# Patient Record
Sex: FEMALE | Race: WHITE | Employment: FULL TIME | URBAN - METROPOLITAN AREA
[De-identification: names, ages, dates, MRNs, and addresses within clinical notes are randomized per-mention and may not be internally consistent; named-entity substitution may affect disease eponyms.]

---

## 2017-06-01 ENCOUNTER — TRANSFERRED RECORDS (OUTPATIENT)
Dept: HEALTH INFORMATION MANAGEMENT | Facility: CLINIC | Age: 52
End: 2017-06-01

## 2018-11-13 ENCOUNTER — TRANSFERRED RECORDS (OUTPATIENT)
Dept: HEALTH INFORMATION MANAGEMENT | Facility: CLINIC | Age: 53
End: 2018-11-13

## 2018-11-14 ENCOUNTER — OFFICE VISIT (OUTPATIENT)
Dept: OBGYN | Facility: CLINIC | Age: 53
End: 2018-11-14
Payer: COMMERCIAL

## 2018-11-14 ENCOUNTER — RADIANT APPOINTMENT (OUTPATIENT)
Dept: MAMMOGRAPHY | Facility: CLINIC | Age: 53
End: 2018-11-14
Payer: COMMERCIAL

## 2018-11-14 VITALS
HEIGHT: 66 IN | HEART RATE: 84 BPM | SYSTOLIC BLOOD PRESSURE: 140 MMHG | WEIGHT: 189.8 LBS | BODY MASS INDEX: 30.5 KG/M2 | DIASTOLIC BLOOD PRESSURE: 84 MMHG

## 2018-11-14 DIAGNOSIS — N93.8 DUB (DYSFUNCTIONAL UTERINE BLEEDING): ICD-10-CM

## 2018-11-14 DIAGNOSIS — Z12.31 VISIT FOR SCREENING MAMMOGRAM: ICD-10-CM

## 2018-11-14 DIAGNOSIS — N95.1 SYMPTOMATIC MENOPAUSAL OR FEMALE CLIMACTERIC STATES: ICD-10-CM

## 2018-11-14 DIAGNOSIS — Z01.419 ENCOUNTER FOR GYNECOLOGICAL EXAMINATION WITHOUT ABNORMAL FINDING: Primary | ICD-10-CM

## 2018-11-14 PROCEDURE — 83001 ASSAY OF GONADOTROPIN (FSH): CPT | Performed by: NURSE PRACTITIONER

## 2018-11-14 PROCEDURE — 99396 PREV VISIT EST AGE 40-64: CPT | Performed by: NURSE PRACTITIONER

## 2018-11-14 PROCEDURE — 77063 BREAST TOMOSYNTHESIS BI: CPT | Mod: TC

## 2018-11-14 PROCEDURE — 87624 HPV HI-RISK TYP POOLED RSLT: CPT | Performed by: NURSE PRACTITIONER

## 2018-11-14 PROCEDURE — 36415 COLL VENOUS BLD VENIPUNCTURE: CPT | Performed by: NURSE PRACTITIONER

## 2018-11-14 PROCEDURE — G0145 SCR C/V CYTO,THINLAYER,RESCR: HCPCS | Performed by: NURSE PRACTITIONER

## 2018-11-14 PROCEDURE — 77067 SCR MAMMO BI INCL CAD: CPT | Mod: TC

## 2018-11-14 ASSESSMENT — ANXIETY QUESTIONNAIRES
3. WORRYING TOO MUCH ABOUT DIFFERENT THINGS: NOT AT ALL
6. BECOMING EASILY ANNOYED OR IRRITABLE: NOT AT ALL
2. NOT BEING ABLE TO STOP OR CONTROL WORRYING: NOT AT ALL
1. FEELING NERVOUS, ANXIOUS, OR ON EDGE: NOT AT ALL
GAD7 TOTAL SCORE: 0
7. FEELING AFRAID AS IF SOMETHING AWFUL MIGHT HAPPEN: NOT AT ALL
IF YOU CHECKED OFF ANY PROBLEMS ON THIS QUESTIONNAIRE, HOW DIFFICULT HAVE THESE PROBLEMS MADE IT FOR YOU TO DO YOUR WORK, TAKE CARE OF THINGS AT HOME, OR GET ALONG WITH OTHER PEOPLE: NOT DIFFICULT AT ALL
5. BEING SO RESTLESS THAT IT IS HARD TO SIT STILL: NOT AT ALL

## 2018-11-14 ASSESSMENT — PATIENT HEALTH QUESTIONNAIRE - PHQ9
5. POOR APPETITE OR OVEREATING: NOT AT ALL
SUM OF ALL RESPONSES TO PHQ QUESTIONS 1-9: 2

## 2018-11-14 NOTE — MR AVS SNAPSHOT
"              After Visit Summary   11/14/2018    Isabel Irene    MRN: [id number]           Patient Information     Date Of Birth          1965        Visit Information        Provider Department      11/14/2018 1:00 PM Zenobia King APRN CNP [facility]        Today's Diagnoses     Encounter for gynecological examination without abnormal finding    -  1    Symptomatic menopausal or female climacteric states        DUB (dysfunctional uterine bleeding)           Follow-ups after your visit        Future tests that were ordered for you today     Open Future Orders        Priority Expected Expires Ordered    US Transvaginal Non OB Routine  11/15/2019 11/14/2018            Who to contact     If you have questions or need follow up information about today's clinic visit or your schedule please [facility] directly at [phone].  Normal or non-critical lab and imaging results will be communicated to you by MyChart, letter or phone within 4 business days after the clinic has received the results. If you do not hear from us within 7 days, please contact the clinic through MyChart or phone. If you have a critical or abnormal lab result, we will notify you by phone as soon as possible.  Submit refill requests through [facility] or call your pharmacy and they will forward the refill request to us. Please allow 3 business days for your refill to be completed.          Additional Information About Your Visit        Care EveryWhere ID     This is your Care EveryWhere ID. This could be used by other organizations to access your [place] medical records  [id number]        Your Vitals Were     Pulse Height Last Period Breastfeeding? BMI (Body Mass Index)       84 5' 6.25\" (1.683 m) 06/10/2018 No 30.4 kg/m2        Blood Pressure from Last 3 Encounters:   11/14/18 140/84    Weight from Last 3 Encounters:   11/14/18 189 lb 12.8 oz (86.1 kg)              We Performed " the Following     Follicle stimulating hormone     HPV High Risk Types DNA Cervical     Pap imaged thin layer screen with HPV - recommended age 30 - 65        Primary Care Provider Fax #    Physician No Ref-Primary [phone]       No address on file        Equal Access to Services     DEBBIE AMATO : Hadii aad ku hadjudi Gonzalez, maria eugenia frazier, diamond siddiqui, valerio keenanmolly castellonjeferson painter chirag shepard. So [facility] [phone].    ATENCIÓN: Si habla español, tiene a welch disposición servicios gratuitos de asistencia lingüística. Llame al [phone].    We comply with applicable federal civil rights laws and [place] laws. We do not discriminate on the basis of race, color, national origin, age, disability, sex, sexual orientation, or gender identity.            Thank you!     Thank you for choosing [facility]  [place] your [place]. Our goal is always to provide you with excellent care. Hearing back from our patients is one way we can continue to improve our services. Please take a few minutes to complete the written survey that you may receive in the mail after your visit with us. Thank you!             Your Updated Medication List - Protect others around you: Learn how to safely use, store and throw away your medicines at www.disposemymeds.org.      Notice  As of 11/14/2018  1:51 PM    You have not been prescribed any medications.

## 2018-11-14 NOTE — NURSING NOTE
Please abstract the following data from this visit with this patient into the appropriate field in Epic:    Colonoscopy done on this date: may or June 2017 (approximately), by this group: [facility], results were [place] found. Due in  2022.

## 2018-11-14 NOTE — LETTER
November 28, 2018    Isabel Irene  [address] [place] [address]  [place] [address]    Dear Isabel,  We are happy to inform you that your PAP smear result from 11/14/18 is normal.  We are now able to do a follow up test on PAP smears. The DNA test is for HPV (Human Papilloma Virus). Cervical cancer is closely linked with certain types of HPV. Your results showed no evidence of high risk HPV.  Therefore we recommend you return in 5 years for your next pap smear and HPV test.  You will still need to return to the clinic every year for an annual exam and other preventive tests.  If you have additional questions regarding this result, please call our registered nurse,  at [phone].  Sincerely,    JOSE CRUZ Anthony CNP/rlm

## 2018-11-14 NOTE — Clinical Note
Please abstract the following data from this visit with this patient into the appropriate field in Epic:  Colonoscopy done on this date: may or June 2017 (approximately), by this group: [facility], results were [place] found. Due in  2022.

## 2018-11-14 NOTE — PROGRESS NOTES
Isabel is a 53 year old  female who presents for annual exam.     Besides routine health maintenance,  she would like to discuss little lump above right breast. .    HPI:here for annual exam.  Has been 4 years since last exam.  She thinks she is in menopause, has no symptons.  Went a year without a cycle, then this last  had a week of bleeding, like a normal cycle.  Now has concerns she states she had a dream that was very vivid last week that woke her up stating she has uterine cancer.  She knows that sounds weird, but she had a dream like that when she didn't know she was pregnant, wasn't trying and dream told her she was pregnant and is a girl and that was correct.  She has no other symptons to make think otherwise.  All blood work is done by PCP.    The patient's PCP is [facility]        GYNECOLOGIC HISTORY:    Patient's last menstrual period was 06/10/2018.  Her current contraception method is: none.  She  reports that she has never smoked. She has never used smokeless tobacco.    Patient is sexually active.  STD testing offered?  Declined  Last PHQ-9 score on record =   PHQ-9 SCORE 2018   Total Score 2     Last GAD7 score on record =   VLAD-7 SCORE 2018   Total Score 0     Alcohol Score = 1    HEALTH MAINTENANCE:  Cholesterol: (No results found for: CHOL   Last Mammo: 14, Result: Benign, Next Mammo: today   Pap: 14 - WNIL, HPV: Negative  Colonoscopy:  May or 2017, Result: Polyps, Next Colonoscopy: .  Dexa:  never    Health maintenance updated:  yes    HISTORY:  Obstetric History       T2      L0     SAB0   TAB0   Ectopic0   Multiple0   Live Births2       # Outcome Date GA Lbr Bunny/2nd Weight Sex Delivery Anes PTL Lv   2 Term            1 Term                   There is no problem list on file for this patient.    Past Surgical History:   Procedure Laterality Date     LEEP TX, CERVICAL      cone     TONSILLECTOMY  1989      Social  "History   Substance Use Topics     Smoking status: Never Smoker     Smokeless tobacco: Never Used     Alcohol use Yes      Problem (# of Occurrences) Relation (Name,Age of Onset)    Breast Cancer (1) Maternal Grandmother    Colon Cancer (1) Paternal Grandfather    Hyperlipidemia (1) Father    Hypertension (1) Father    Other Cancer (1) Mother: Lung, bone and brain cancer            No current outpatient prescriptions on file.     No current facility-administered medications for this visit.      Not on File    Past medical, surgical, social and family histories were reviewed and updated in EPIC.    ROS:   12 point review of systems negative other than symptoms noted below.  Breast: Lumps    EXAM:  /84 (BP Location: Right arm, Patient Position: Sitting, Cuff Size: Adult Regular)  Pulse 84  Ht 5' 6.25\" (1.683 m)  Wt 189 lb 12.8 oz (86.1 kg)  LMP 06/10/2018  Breastfeeding? No  BMI 30.4 kg/m2   BMI: Body mass index is 30.4 kg/(m^2).    PHYSICAL EXAM:  Constitutional:  Appearance: Well nourished, well developed, alert, in no acute distress  Neck:  Lymph Nodes:  No lymphadenopathy present    Thyroid:  Gland size normal, nontender, no nodules or masses present  on palpation  Chest:  Respiratory Effort:  Breathing unlabored  Cardiovascular:    Heart: Auscultation:  Regular rate, normal rhythm, no murmurs present  Breasts: Inspection of Breasts:  No lymphadenopathy present., Palpation of Breasts and Axillae:  No masses present on palpation, no breast tenderness., Axillary Lymph Nodes:  No lymphadenopathy present. and No nodularity, asymmetry or nipple discharge bilaterally. no lump felt she has fibrocystic areas in both UOQ of breasts.   Gastrointestinal:   Abdominal Examination:  Abdomen nontender to palpation, tone normal without rigidity or guarding, no masses present, umbilicus without lesions   Liver and Spleen:  No hepatomegaly present, liver nontender to palpation    Hernias:  No hernias " present  Lymphatic: Lymph Nodes:  No other lymphadenopathy present  Skin:  General Inspection:  No rashes present, no lesions present, no areas of  discoloration    Genitalia and Groin:  No rashes present, no lesions present, no areas of  discoloration, no masses present  Neurologic/Psychiatric:    Mental Status:  Oriented X3     Pelvic Exam:  External Genitalia:     Normal appearance for age, no discharge present, no tenderness present, no inflammatory lesions present, color normal  Vagina:     Normal vaginal vault without central or paravaginal defects, no discharge present, no inflammatory lesions present, no masses present  Bladder:     Nontender to palpation  Urethra:   Urethral Body:  Urethra palpation normal, urethra structural support normal   Urethral Meatus:  No erythema or lesions present  Cervix:     Appearance healthy, no lesions present, nontender to palpation, no bleeding present  Uterus:     Uterus: firm, normal sized and nontender, anteverted in position.   Adnexa:     No adnexal tenderness present, no adnexal masses present  Perineum:     Perineum within normal limits, no evidence of trauma, no rashes or skin lesions present  Anus:     Anus within normal limits, no hemorrhoids present  Inguinal Lymph Nodes:     No lymphadenopathy present  Pubic Hair:     Normal pubic hair distribution for age  Genitalia and Groin:     No rashes present, no lesions present, no areas of discoloration, no masses present      COUNSELING:   Reviewed preventive health counseling, as reflected in patient instructions       Regular exercise       Healthy diet/nutrition       Colon cancer screening       (Adenike)menopause management    BMI: Body mass index is 30.4 kg/(m^2).      ASSESSMENT:  53 year old female with satisfactory annual exam.    ICD-10-CM    1. Encounter for gynecological examination without abnormal finding Z01.419 Pap imaged thin layer screen with HPV - recommended age 30 - 65     HPV High Risk Types DNA  Cervical   2. Symptomatic menopausal or female climacteric states N95.1 Follicle stimulating hormone   3. DUB (dysfunctional uterine bleeding) N93.8 US Transvaginal Non OB       PLAN:  Normal Gyn exam.  Going to check a FSH.  Return for a pelvic US.  Return 1 year for annual.    Zenobia Carreon, RNC

## 2018-11-15 LAB — FSH SERPL-ACNC: 65.2 IU/L

## 2018-11-15 ASSESSMENT — ANXIETY QUESTIONNAIRES: GAD7 TOTAL SCORE: 0

## 2018-11-16 LAB
COPATH REPORT: NORMAL
PAP: NORMAL

## 2018-11-20 LAB
FINAL DIAGNOSIS: NORMAL
HPV HR 12 DNA CVX QL NAA+PROBE: NEGATIVE
HPV16 DNA SPEC QL NAA+PROBE: NEGATIVE
HPV18 DNA SPEC QL NAA+PROBE: NEGATIVE
SPECIMEN DESCRIPTION: NORMAL
SPECIMEN SOURCE CVX/VAG CYTO: NORMAL

## 2018-12-04 ENCOUNTER — OFFICE VISIT (OUTPATIENT)
Dept: OBGYN | Facility: CLINIC | Age: 53
End: 2018-12-04
Payer: COMMERCIAL

## 2018-12-04 ENCOUNTER — RADIANT APPOINTMENT (OUTPATIENT)
Dept: ULTRASOUND IMAGING | Facility: CLINIC | Age: 53
End: 2018-12-04
Payer: COMMERCIAL

## 2018-12-04 VITALS
BODY MASS INDEX: 30.37 KG/M2 | DIASTOLIC BLOOD PRESSURE: 76 MMHG | HEART RATE: 72 BPM | SYSTOLIC BLOOD PRESSURE: 134 MMHG | HEIGHT: 66 IN | WEIGHT: 189 LBS

## 2018-12-04 DIAGNOSIS — N93.8 DUB (DYSFUNCTIONAL UTERINE BLEEDING): ICD-10-CM

## 2018-12-04 DIAGNOSIS — N83.201 CYST OF RIGHT OVARY: Primary | ICD-10-CM

## 2018-12-04 PROCEDURE — 76830 TRANSVAGINAL US NON-OB: CPT | Performed by: OBSTETRICS & GYNECOLOGY

## 2018-12-04 PROCEDURE — 99213 OFFICE O/P EST LOW 20 MIN: CPT | Performed by: NURSE PRACTITIONER

## 2018-12-04 NOTE — PROGRESS NOTES
"    SUBJECTIVE:                                                   Isabel Irene is a 53 year old female who presents to clinic today for the following health issue(s):  Patient presents with:  Ultrasound: f/u to dysfunctional uterine bleeding      HPI: here for follow up on DUB and pelvic US      Patient's last menstrual period was 2018 (approximate)..   Patient is sexually active, .  Using menopause for contraception.    reports that she has never smoked. She has never used smokeless tobacco.    STD testing offered?  Declined    Health maintenance updated:  yes    Problem list and histories reviewed & adjusted, as indicated.  Additional history: as documented.    There is no problem list on file for this patient.    Past Surgical History:   Procedure Laterality Date     LEEP TX, CERVICAL  1992    cone     TONSILLECTOMY        Social History   Substance Use Topics     Smoking status: Never Smoker     Smokeless tobacco: Never Used     Alcohol use Yes      Problem (# of Occurrences) Relation (Name,Age of Onset)    Breast Cancer (1) Maternal Grandmother    Colon Cancer (1) Paternal Grandfather    Hyperlipidemia (1) Father    Hypertension (1) Father    Other Cancer (1) Mother: Lung, bone and brain cancer            No current outpatient prescriptions on file.     No current facility-administered medications for this visit.      Not on File    ROS:  12 point review of systems negative other than symptoms noted below.    OBJECTIVE:     /76  Pulse 72  Ht 5' 6.25\" (1.683 m)  Wt 189 lb (85.7 kg)  LMP 2018 (Approximate)  BMI 30.28 kg/m2  Body mass index is 30.28 kg/(m^2).    Exam:  Constitutional:  Appearance: Well nourished, well developed alert, in no acute distress   Discussed US results.    In-Clinic Test Results:  Results for orders placed or performed in visit on 18 (from the past 24 hour(s))   US Transvaginal Non OB    Narrative    US Transvaginal Non OB    Order #: [id number] " Accession #: [id number]         Study Notes        Delvis Ainsley on 12/4/2018  9:32 AM     Gynecological Ultrasound Report  Pelvic U/S - Transvaginal    [facility]  Referring Provider: Dr. Marry Davey  Sonographer: Ainsley Edmondson [facility]  Indication: Postmenopausal bleeding  LMP (mm/dd/yyyy): Postmenopausal  History:   Gynecological Ultrasonography:   Uterus: anteverted  Size: 7.77 x 4.97 x 3.99cm.    Findings: Normal   Endometrium: Thickness total 8.56mm  Right Ovary: 5.80 x 4.50 x 3.79cm.    Findings: Right ovarian cyst= 5.4x 5.2x 3.5cm  Left Ovary: 2.47 x 1.94 x 1.26cm.   Findings: Left ovarian cyst= 1.4x 1.2x .9cm  Cul de Sac/Pouch of Sam: No FF      Impression:  ___________________________________________________________________________  _______  SONOGRAPHER NOTES TO READING PROVIDER:   Patient being seen by Nina King                 The endometrium appears thickened if truly menopausal.  A simple cyst was noted in the left ovary, most likely consistent with a   physiological cyst, and measures 1.4 x 1.2 x 0.9cm.   There is also a large simple cyst in her right ovary that is measuring 5.4   x 5.2 x 3.5 cm. Would recommend repeat ultrasound in 6-8 weeks to confirm   resolution of this larger cyst.    Marry Davey MD         ASSESSMENT/PLAN:                                                        ICD-10-CM    1. Cyst of right ovary N83.201 US Transvaginal Non OB       There are no Patient Instructions on file for this visit.    Will return for repeat US in 6 weeks, follow up with JOSE CRUZ Mendoza [facility]

## 2018-12-04 NOTE — MR AVS SNAPSHOT
"              After Visit Summary   12/4/2018    Isabel Irene    MRN: [id number]           Patient Information     Date Of Birth          1965        Visit Information        Provider Department      12/4/2018 10:00 AM Zenobia King APRN CNP [facility]        Today's Diagnoses     Cyst of right ovary    -  1       Follow-ups after your visit        Follow-up notes from your care team     Return in about 6 weeks (around 1/15/2019) for pelvic US.      Future tests that were ordered for you today     Open Future Orders        Priority Expected Expires Ordered    US Transvaginal Non OB Routine  12/5/2019 12/4/2018            Who to contact     If you have questions or need follow up information about today's clinic visit or your schedule please contact [facility] directly at [phone].  Normal or non-critical lab and imaging results will be communicated to you by MyChart, letter or phone within 4 business days after the clinic has received the results. If you do not hear from us within 7 days, please contact the clinic through MyChart or phone. If you have a critical or abnormal lab result, we will notify you by phone as soon as possible.  Submit refill requests through [facility] or call your pharmacy and they will forward the refill request to us. Please allow 3 business days for your refill to be completed.          Additional Information About Your Visit        Care EveryWhere ID     This is your Care EveryWhere ID. This could be used by other organizations to access your [place] medical records  [id number]        Your Vitals Were     Pulse Height Last Period BMI (Body Mass Index)          72 5' 6.25\" (1.683 m) 06/20/2018 (Approximate) 30.28 kg/m2         Blood Pressure from Last 3 Encounters:   12/04/18 134/76   11/14/18 140/84    Weight from Last 3 Encounters:   12/04/18 189 lb (85.7 kg)   11/14/18 189 lb 12.8 oz (86.1 kg)               Primary Care " Provider Fax #    Physician No Ref-Primary [phone]       No address on file        Equal Access to Services     DEBBIE AMATO : Hadii aad ku hadyolandesandy Yvettedavid, jonasmckinley avilezsandraha, diamond bonilla candejack, valerio fernandoin hayaamolly castellonjeferson painter chirag shepard. So [facility] [phone].    ATENCIÓN: Si habla español, tiene a welch disposición servicios gratuitos de asistencia lingüística. Llame al [phone].    We comply with applicable federal civil rights laws and [place] laws. We do not discriminate on the basis of race, color, national origin, age, disability, sex, sexual orientation, or gender identity.            Thank you!     Thank you for choosing [facility]  for your care. Our goal is always to provide you with excellent care. Hearing back from our patients is one way we can continue to improve our services. Please take a few minutes to complete the written survey that you may receive in the mail after your visit with us. Thank you!             Your Updated Medication List - Protect others around you: Learn how to safely use, store and throw away your medicines at www.disposemymeds.org.      Notice  As of 12/4/2018  1:36 PM    You have not been prescribed any medications.

## 2019-01-11 NOTE — PROGRESS NOTES
SUBJECTIVE:                                                   Isabel Irene is a 53 year old female who presents to clinic today for the following health issue(s):  Patient presents with:  Ultrasound: Cyst       HPI:  Patient was in  for an annual and was supposed to see me but I had to leave on delivery. Saw Nina. Really was feeling completely fine other than she had a dream that she had uterine cancer. Is never a hypochondriac and has had 3 dreams in her life that have been like this and the other 2 were true. Decided to mention it to Nina and an U/s was obtained.    Patient had a period , then  and now nothing since. At the time of her U/S  she had a normal homogeneous EMS of 7-8 mm w/o polyps or fibroids. She had a 1.1cm simple cyst on her left ovary and then a 5.4cm cyst on her right ovary. Both completely simple. No free fluid. Patient had had no pain to that point. She also at that point hadn't had any vasomotor sx of any kind. Had an FSH done that day that was 68. Plan was to repeat the U/s in 6-8 weeks for cyst resolution.    Has still not had any bleeding. The day after she was here last she had a few second intense twinge in the RLQ and then it resolved. Actually thought maybe the cyst had burst.  Has had no pain at all since then. She did start getting some hot flashes and nightsweats literally the day after she was here last. They are short lived, not overly intenses, a few times a day but nothing where she's changing bed linens or clothes or anything she can't deal with just thought it was ironic that hadn't had any until appointment and now has for the last 6 weeks    U/S today shows a thinner EMS of just over 5mm  The right ovarian cyst is still completely simple and slightly smaller at 5 x 4.8 x 3.5.  The left cyst is also simple and fairly stable at 1.4 x 1.2 0.9cm    Patient's last menstrual period was 2018 (approximate)..   Patient is sexually active, .  Using  menopause for contraception.    reports that  has never smoked. she has never used smokeless tobacco.    STD testing offered?  Declined    Health maintenance updated:  yes    Today's PHQ-2 Score: No flowsheet data found.  Today's PHQ-9 Score:   PHQ-9 SCORE 11/14/2018   PHQ-9 Total Score 2     Today's VLAD-7 Score:   VLAD-7 SCORE 11/14/2018   Total Score 0       Problem list and histories reviewed & adjusted, as indicated.  Additional history: as documented.    There is no problem list on file for this patient.    Past Surgical History:   Procedure Laterality Date     LEEP TX, CERVICAL  1992    cone     TONSILLECTOMY  1989      Social History     Tobacco Use     Smoking status: Never Smoker     Smokeless tobacco: Never Used   Substance Use Topics     Alcohol use: Yes      Problem (# of Occurrences) Relation (Name,Age of Onset)    Breast Cancer (1) Maternal Grandmother    Colon Cancer (1) Paternal Grandfather    Hyperlipidemia (1) Father    Hypertension (1) Father    Other Cancer (1) Mother: Lung, bone and brain cancer            No current outpatient medications on file.     No current facility-administered medications for this visit.      No Known Allergies    ROS:  12 point review of systems negative other than symptoms noted below.    OBJECTIVE:     /82   Wt 88.3 kg (194 lb 9.6 oz)   LMP 06/20/2018 (Approximate)   Breastfeeding? No   BMI 31.17 kg/m    Body mass index is 31.17 kg/m .    Exam:  Constitutional:  Appearance: Well nourished, well developed alert, in no acute distress     In-Clinic Test Results:  Results for orders placed or performed in visit on 01/14/19 (from the past 24 hour(s))   US Transvaginal Non OB    Narrative    US Transvaginal Non OB   Order #: [id number] Accession #: [id number]   Study Notes        Ainsley Edmondson on 1/14/2019 10:21 AM   Gynecological Ultrasound Report  Pelvic U/S - Transvaginal    [facility]  Referring Provider: Dr. Marry Davey  Sonographer:  Ainsley Edmondson [facility]  Indication: F/U right ovarian cyst  LMP (mm/dd/yyyy): Postmenopausal  History:   Gynecological Ultrasonography:   Uterus: anteverted  Size: 7.33 x 4.94 x 3.94cm.    Findings: Normal   Endometrium: Thickness total 5.29mm  Right Ovary: 5.46 x 4.80 x 3.64cm.    Findings: Simple right ovarian cyst= 4.8x 5x 3.5cm  Left Ovary: 2.04 x 1.75 x 1.71cm.   Findings: Simple left ovarian cyst= 1.1x 1x 1.1cm  Cul de Sac/Pouch of Sam: No FF      Impression:  ___________________________________________________________________________  _______              The endometrium appeared normal.  A simple cyst was noted in the right ovary that is 4.8 x 5 x 3.5cm. This   is stable to slightly smaller from last U/S when it was 5.4 x 5.2x 3.5cm  There is a simple cyst in the left ovary as well that is 1.4 x 1.2 x   0.9cm. This is stable from last U/S when it was 1.1cm    Marry Davey MD       ASSESSMENT/PLAN:                                                        ICD-10-CM    1. Cysts of both ovaries N83.201 US Pelvic Limited    N83.202          Patient's cysts are completely simple in appearance. The smaller one is stable in size and the larger one is slightly smaller in size.  Discussed that very low suspicion for cancer or dermoid or other atypical cyst  These are likely physiologic and hormonal in this perimenopausal time period  She is asx and no pain.  Discussed si/sx and risk for torsion with a cyst greater than 5cm in size  We discussed L/S cystectomy vs doing a repeat U/S in 3 months to follow them. Patient has opted for the later which is completely reasonable given the features of the cysts.  If has worsening pain then can always do it sooner  Spent 15 min with the patient 100% of which was in face to face counseling time    Marry Davey MD  [facility]

## 2019-01-14 ENCOUNTER — OFFICE VISIT (OUTPATIENT)
Dept: OBGYN | Facility: CLINIC | Age: 54
End: 2019-01-14
Attending: NURSE PRACTITIONER
Payer: COMMERCIAL

## 2019-01-14 ENCOUNTER — ANCILLARY PROCEDURE (OUTPATIENT)
Dept: ULTRASOUND IMAGING | Facility: CLINIC | Age: 54
End: 2019-01-14
Attending: NURSE PRACTITIONER
Payer: COMMERCIAL

## 2019-01-14 VITALS — BODY MASS INDEX: 31.17 KG/M2 | DIASTOLIC BLOOD PRESSURE: 82 MMHG | SYSTOLIC BLOOD PRESSURE: 130 MMHG | WEIGHT: 194.6 LBS

## 2019-01-14 DIAGNOSIS — N83.202 CYSTS OF BOTH OVARIES: Primary | ICD-10-CM

## 2019-01-14 DIAGNOSIS — N83.201 CYSTS OF BOTH OVARIES: Primary | ICD-10-CM

## 2019-01-14 DIAGNOSIS — N83.201 CYST OF RIGHT OVARY: ICD-10-CM

## 2019-01-14 PROCEDURE — 76830 TRANSVAGINAL US NON-OB: CPT | Performed by: OBSTETRICS & GYNECOLOGY

## 2019-01-14 PROCEDURE — 99213 OFFICE O/P EST LOW 20 MIN: CPT | Performed by: OBSTETRICS & GYNECOLOGY

## 2019-03-19 NOTE — PROGRESS NOTES
SUBJECTIVE:                                                   Isabel Irene is a 53 year old female who presents to clinic today for the following health issue(s):  Patient presents with:  Ultrasound: follow up cyst      HPI:  Patient had a disturbing dream last  that she had uterine cancer. Saw Nina angienemalik in November and decided to just get an U/S to give her peace of mind. Was starting to have more infrequent and irregular periods. Had had no vasomotor sx or pain at that point.    U/S found a 7-8mm lining but normal since not menopausal. However there was a 5 cm simple cyst on the right.    The day she left she had a few minutes of really sharp pain on her right and hadn't had any pain prior, assumed the cyst ruptured, but then felt fine.    Repeat U/S in  showed that the cyst was still there but her EMS was 4-5mm.    Had one random FSH in November that was 65 though estradiol level not done    No period since . Since November has maybe had 7 times where she felt all the sudden warm. Not sweating and red faced and fanning herself like her coworkers and family. Wasn't even sure it was anything but a couple of the times felt even a wave of nausea from it. No night sweats at all. Now the last 2 months hasn't had any of that at all and feeling perfectly fine    Repeat U/S today shows a stable simple cyst of 5 cm. However her EMS is thicker today at 14-15mm. There is some possible adeno seen as there are projections from the EMS into the myometrium.    Patient's last menstrual period was 2018 (approximate)..   Patient is sexually active, .  Using none for contraception. Postmenopausal   reports that she has never smoked. She has never used smokeless tobacco.    STD testing offered?  Declined    Health maintenance updated:  yes    Problem list and histories reviewed & adjusted, as indicated.  Additional history: as documented.    There is no problem list on file for this patient.    Past  Surgical History:   Procedure Laterality Date     LEEP TX, CERVICAL  1992    cone     TONSILLECTOMY  1989      Social History     Tobacco Use     Smoking status: Never Smoker     Smokeless tobacco: Never Used   Substance Use Topics     Alcohol use: Yes      Problem (# of Occurrences) Relation (Name,Age of Onset)    Breast Cancer (1) Maternal Grandmother    Colon Cancer (1) Paternal Grandfather    Hyperlipidemia (1) Father    Hypertension (1) Father    Other Cancer (1) Mother: Lung, bone and brain cancer            No current outpatient medications on file.     No current facility-administered medications for this visit.      No Known Allergies    ROS:  12 point review of systems negative other than symptoms noted below.    OBJECTIVE:     /88   Wt 89.2 kg (196 lb 9.6 oz)   LMP 06/20/2018 (Approximate)   BMI 31.49 kg/m    Body mass index is 31.49 kg/m .    Exam:  Constitutional:  Appearance: Well nourished, well developed alert, in no acute distress     In-Clinic Test Results:  Results for orders placed or performed in visit on 04/15/19 (from the past 24 hour(s))      Result Value Ref Range     11 0 - 30 U/mL   Follicle stimulating hormone   Result Value Ref Range    FSH 34.2 IU/L   Estradiol   Result Value Ref Range    Estradiol 62 pg/mL       ASSESSMENT/PLAN:                                                        ICD-10-CM    1. Right ovarian cyst N83.201      Follicle stimulating hormone     Estradiol     US Pelvic Complete w Transvaginal   2. Thickened endometrium R93.89 Follicle stimulating hormone     Estradiol     US Pelvic Complete w Transvaginal         Patient continues to be asx from her simple cyst. Hasn't had a ca-125 so will do that just for extra reassurance but imaging is c/w simple cyst  Discussed doing L/S to remove and do cystectomy or just USO given her age.    Also now has a thicker EMS. If was truly menopausal then would definitely recommend EMBx at this time. However  all of her mild vasomotor sx went away, she's not officially menopausal yet, the lining is thicker. I anticipate that she is actually going to have another cycle in the next couple of months. Discussed benefit and limitations of checking hormones but in her case fsh and estradiol repeat may be helpful to know if really should be moving forward with embx or ok to wait.    If labs are c/w true full menopause vs perimenopause then could either return for EMBx or could give it a couple of months and if bleeds be reassured and if bleeding is after June then address it as typical postmenopausal bleeding. If labs are perimenopausal then would just observe for another 2-4 months or so.    Will return in 4 months for repeat imaging of the cyst and to figure out her EMS and if she cycled again. If still thick at that point would do EMBx.  Discussed that she has a small but present risk for torsion, any cyst even if benign at age 53 could have some concern. So if we do proceed to L/S for cystectomy then would def recommend HSC with myosure at that time.    Spent 15 min with patient 100% of which was in counseling time    Marry Davey MD  [facility] [place]

## 2019-04-15 ENCOUNTER — OFFICE VISIT (OUTPATIENT)
Dept: OBGYN | Facility: CLINIC | Age: 54
End: 2019-04-15
Payer: COMMERCIAL

## 2019-04-15 ENCOUNTER — ANCILLARY PROCEDURE (OUTPATIENT)
Dept: ULTRASOUND IMAGING | Facility: CLINIC | Age: 54
End: 2019-04-15
Payer: COMMERCIAL

## 2019-04-15 VITALS — SYSTOLIC BLOOD PRESSURE: 130 MMHG | DIASTOLIC BLOOD PRESSURE: 88 MMHG | BODY MASS INDEX: 31.49 KG/M2 | WEIGHT: 196.6 LBS

## 2019-04-15 DIAGNOSIS — R93.89 THICKENED ENDOMETRIUM: ICD-10-CM

## 2019-04-15 DIAGNOSIS — N83.201 RIGHT OVARIAN CYST: Primary | ICD-10-CM

## 2019-04-15 DIAGNOSIS — N83.201 CYSTS OF BOTH OVARIES: ICD-10-CM

## 2019-04-15 DIAGNOSIS — N83.202 CYSTS OF BOTH OVARIES: ICD-10-CM

## 2019-04-15 LAB
CANCER AG125 SERPL-ACNC: 11 U/ML (ref 0–30)
ESTRADIOL SERPL-MCNC: 62 PG/ML
FSH SERPL-ACNC: 34.2 IU/L

## 2019-04-15 PROCEDURE — 86304 IMMUNOASSAY TUMOR CA 125: CPT | Performed by: OBSTETRICS & GYNECOLOGY

## 2019-04-15 PROCEDURE — 82670 ASSAY OF TOTAL ESTRADIOL: CPT | Performed by: OBSTETRICS & GYNECOLOGY

## 2019-04-15 PROCEDURE — 83001 ASSAY OF GONADOTROPIN (FSH): CPT | Performed by: OBSTETRICS & GYNECOLOGY

## 2019-04-15 PROCEDURE — 36415 COLL VENOUS BLD VENIPUNCTURE: CPT | Performed by: OBSTETRICS & GYNECOLOGY

## 2019-04-15 PROCEDURE — 76857 US EXAM PELVIC LIMITED: CPT | Performed by: OBSTETRICS & GYNECOLOGY

## 2019-04-15 PROCEDURE — 99213 OFFICE O/P EST LOW 20 MIN: CPT | Performed by: OBSTETRICS & GYNECOLOGY

## 2019-04-16 ENCOUNTER — TELEPHONE (OUTPATIENT)
Dept: OBGYN | Facility: CLINIC | Age: 54
End: 2019-04-16

## 2019-07-24 NOTE — PROGRESS NOTES
SUBJECTIVE:                                                   Isabel Irene is a 54 year old female who presents to clinic today for the following health issue(s):  Patient presents with:  Ultrasound: f/u to right ovarian cyst and thickened endometrium      HPI:  Patient has now been monitored for about a year after an U/S found a 5cm right ovarian cyst. It was simple and she was asx and so we have now done several U/S to follow it. It hasn't grown and remains simple looking but it is persisting.    In addition her first U/S found an EMs of 7-8mm and repeat was 4-5mm. She was not yet menopausal at that time.    Then saw patient  and her EMS was 14-15mm. Her LMP was  prior to that so was at 10 months. She had been having vasomotor sx and then they had all stopped just prior to that U/S. Her FSH had been 60s at some point prior and thn at that visit FSH was 30s and estradiol was 60s.    Felt that if she were menopausal the EMS was clearly thick. However the rest of her clinical picture seemed to support that a period was coming. Plan was to call if she didn't get her period in 2 months time but if she did, then that was supported by other data, and then her f/u U/S for her cyst could be done in august as planned.    Patient left after that mid April appointment thinking her period would come any day b/c of all of her PMS type sx and resolution of vasomotor sx. However nothing happened until . Then had 4 days of light bleeding. It was red but light. Decided that since she got a period it was fine and would just keep today's appointment as planned    Her EMS today appears thick again at 15.5mm. It has a somewhat trilaminar appearance though. It's not irregular appearing. She has the persistent 5cm simple cyst on the right ovary and now a [id number] follicle on the left.    Patient's LMP 19(light, 4 days) prior was 2018  Patient is sexually active, .  Using nothing for contraception.     "reports that she has never smoked. She has never used smokeless tobacco.    STD testing offered?  Declined    Health maintenance updated:  yes    Problem list and histories reviewed & adjusted, as indicated.  Additional history: as documented.    There is no problem list on file for this patient.    Past Surgical History:   Procedure Laterality Date     LEEP TX, CERVICAL  1992    cone     TONSILLECTOMY  1989      Social History     Tobacco Use     Smoking status: Never Smoker     Smokeless tobacco: Never Used   Substance Use Topics     Alcohol use: Yes      Problem (# of Occurrences) Relation (Name,Age of Onset)    Breast Cancer (1) Maternal Grandmother    Colon Cancer (1) Paternal Grandfather    Hyperlipidemia (1) Father    Hypertension (1) Father    Other Cancer (1) Mother: Lung, bone and brain cancer            No current outpatient medications on file.     No current facility-administered medications for this visit.      No Known Allergies    ROS:  12 point review of systems negative other than symptoms noted below.    OBJECTIVE:     BP (!) 146/72   Pulse 76   Ht 1.683 m (5' 6.25\")   Wt 88.9 kg (196 lb)   LMP 06/20/2018 (Approximate)   BMI 31.40 kg/m    Body mass index is 31.4 kg/m .    Exam:  Constitutional:  Appearance: Well nourished, well developed alert, in no acute distress  Skin:General Inspection:  No rashes present, no lesions present, no areas of discoloration; Genitalia and Groin:  No rashes present, no lesions present, no areas of discoloration, no masses present.  Pelvic Exam:  External Genitalia:     Normal appearance for age, no discharge present, no tenderness present, no inflammatory lesions present, color normal  Vagina:     Normal vaginal vault without central or paravaginal defects, no discharge present, no inflammatory lesions present, no masses present  Bladder:     Nontender to palpation  Urethra:   Urethral Body:  Urethra palpation normal, urethra structural support normal   Urethral " Meatus:  No erythema or lesions present  Cervix:     Appearance healthy, no lesions present, nontender to palpation, no bleeding present  Uterus:     Uterus: firm, normal sized and nontender, anteverted in position.   Adnexa:     No adnexal tenderness present, no adnexal masses present  Perineum:     Perineum within normal limits, no evidence of trauma, no rashes or skin lesions present  Anus:     Anus within normal limits, no hemorrhoids present  Inguinal Lymph Nodes:     No lymphadenopathy present  Pubic Hair:     Normal pubic hair distribution for age  Genitalia and Groin:     No rashes present, no lesions present, no areas of discoloration, no masses present       In-Clinic Test Results:  Results for orders placed or performed in visit on 08/05/19 (from the past 24 hour(s))   US Pelvic Complete w Transvaginal    Narrative    US Pelvic Complete w Transvaginal   Order #: [id number] Accession #: [id number]   Study Notes      Ainsley Edmondson on 8/5/2019 10:31 AM   Gynecological Ultrasound Report  Pelvic U/S - Transvaginal    [facility]  Referring Provider: Dr. Marry Davey  Sonographer: Ainsley Edmondson [facility]  Indication: F/U ovarian cyst  LMP (mm/dd/yyyy): 07/18/19  History:   Gynecological Ultrasonography:   Uterus: anteverted  Size: 9.50 x 5.82 x 4.87cm.     Endometrium: Thickness total 15.46mm  Findings: Thick  Right Ovary: 5.49 x 5.82 x 4.04cm.    Findings: Right ovarian cyst= 5x 5.1x 3.4cm  Left Ovary: 2.74 x 3.17 x 2.78cm.   Findings: Left ovarian follicle/cyst= 2.1x 1.7x 1.5cm  Cul de Sac/Pouch of Sam: No FF      Impression:  ___________________________________________________________________________  _______              The endometrium appears thickened at 15.5mm though may be appropriate for   timing of cycle.  A simple cyst was noted in the right ovary that is stable to slightly   larger than prior imaging. It is measuring 5 x 5.1 x 3.4cm and was 5.3 x   4.6 x 3.7 consistent with a  physiological cyst and is simple in appearance  The left ovary has a follicle/cyst that is also simple in appearance and   2.1 x 1.7 x 1.5cm    Marry Davey MD         ASSESSMENT/PLAN:                                                        ICD-10-CM    1. Thickened endometrium R93.89 ENDOMETRIAL BIOPSY W/O CERVICAL DILATION     Surgical pathology exam   2. Right ovarian cyst N83.201            Patient's cyst is persistent and just over 5cm but it is simple, asymptomatic and had a  of 11 4 months ago. For the cyst alone we discussed unsure etiology but likely benign, risk of torsion, surgical intervention for definitive mgmt or continued surveillance. Prefers the latter if possible    In terms of her EMS, it is thick and she officially was 13 month w/o a period making her menopausal and then had bleeding. So technically now PMB with thick EMS so embx was recommended and done as per below.  Discussed that this is still highly suspicious for perimenpausal/hormonal bleeding and nothing more but that bx would r/o hyperplasia and cancer. Doesn't appear to be a polyp but if path showed that as a possibility could certainly do an SIS. Could also consider full D&C. If had to do that with anesthesia then would consider doing cystectomy/oopherectomy.  Bx done and will f/u with her when path is back.  Given the thick ems and a potentially developing follicle on the left she may have another cycle in a couple of weeks again so cautioned to be looking for that.    Spent 15 min >50% of which was in face to face counseling time    Marry Davey MD  [facility] [place]    INDICATIONS:                                                    Is a pregnancy test required: No.  Was a consent obtained?  Yes    Having endometrial biopsy for thickened endometrium    Today's PHQ-2 Score: No flowsheet data found.    PROCEDURE;                                                      A speculum was placed in the vagina and cervix  prepped with betadine. A tenaculum was attached to the cervix. A small plastic 5 mm Pipelle syringe curette was inserted into the cervical canal. The uterus was sounded to 7.5 cm's. A vigorous four quadrant biopsy was performed, removing amount moderate  of tissue. The speculum was removed. This tissue was placed in Formalin and sent to pathology.    The patient tolerated the procedure  well and she reported there was  cramping.      POST PROCEDURE;                                                      There  was no cramping at the time of discharge. She  tolerated the procedure well with minimal discomfort. There were no complications. Patient was discharged in stable condition.    Patient advised to call the clinic if severe pelvic pain, fever or heavy bleeding.    Marry Davey MD

## 2019-08-05 ENCOUNTER — OFFICE VISIT (OUTPATIENT)
Dept: OBGYN | Facility: CLINIC | Age: 54
End: 2019-08-05
Attending: OBSTETRICS & GYNECOLOGY
Payer: COMMERCIAL

## 2019-08-05 ENCOUNTER — ANCILLARY PROCEDURE (OUTPATIENT)
Dept: ULTRASOUND IMAGING | Facility: CLINIC | Age: 54
End: 2019-08-05
Attending: OBSTETRICS & GYNECOLOGY
Payer: COMMERCIAL

## 2019-08-05 VITALS
DIASTOLIC BLOOD PRESSURE: 72 MMHG | HEIGHT: 66 IN | HEART RATE: 76 BPM | WEIGHT: 196 LBS | BODY MASS INDEX: 31.5 KG/M2 | SYSTOLIC BLOOD PRESSURE: 146 MMHG

## 2019-08-05 DIAGNOSIS — N83.201 RIGHT OVARIAN CYST: ICD-10-CM

## 2019-08-05 DIAGNOSIS — R93.89 THICKENED ENDOMETRIUM: Primary | ICD-10-CM

## 2019-08-05 DIAGNOSIS — R93.89 THICKENED ENDOMETRIUM: ICD-10-CM

## 2019-08-05 PROCEDURE — 99213 OFFICE O/P EST LOW 20 MIN: CPT | Mod: 25 | Performed by: OBSTETRICS & GYNECOLOGY

## 2019-08-05 PROCEDURE — 76830 TRANSVAGINAL US NON-OB: CPT | Performed by: OBSTETRICS & GYNECOLOGY

## 2019-08-05 PROCEDURE — 88305 TISSUE EXAM BY PATHOLOGIST: CPT | Performed by: OBSTETRICS & GYNECOLOGY

## 2019-08-05 PROCEDURE — 58100 BIOPSY OF UTERUS LINING: CPT | Performed by: OBSTETRICS & GYNECOLOGY

## 2019-08-05 ASSESSMENT — MIFFLIN-ST. JEOR: SCORE: 1509.77

## 2019-08-07 DIAGNOSIS — N83.201 RIGHT OVARIAN CYST: Primary | ICD-10-CM

## 2019-08-07 DIAGNOSIS — R93.89 THICKENED ENDOMETRIUM: ICD-10-CM

## 2019-08-07 LAB — COPATH REPORT: NORMAL

## 2019-08-07 NOTE — PROGRESS NOTES
Marry aDvey MD  P We Triage             Patient's embx is benign. Just shows proliferative phase endometrium which is the phase leading up to ovulation. She had a 17mm follicle on one of her ovaries which very likely was a sign of ovulation, so this all matches. Wouldn't be surprised if she got another period within the month   Let's plan for a repeat U/S in 4 months rather than 6 months so we can check her cyst but also f/u on the thick EMS. If she again only has a light cycle or even no cycle at all between now and 4 months, but the lining is still thick on her U/S then we may need to consider a d&C.   Can discuss that after her next U/S in more detail. This bx is very reassuring though       Orders for future US placed per Dr Davey

## 2019-11-27 NOTE — PROGRESS NOTES
SUBJECTIVE:                                                   Isabel Irene is a 54 year old female who presents to clinic today for the following health issue(s):  Patient presents with:  Ultrasound: f/u to right ovarian cyst and thickened endometrium      HPI:  Patient has had many months of monitoring for a right ovarian 5cm simple cyst and then a thickened EMS  Patient has never gone one full year without a period to be considered menopausal but she has had many months of no bleeding and then will have a very light spotting, light bleeding for a few days but not a full period.  Her right ovarian cyst has persisted at about 5-5.3cm on 4 different U/S. Her  was 11 in April. Her FSH and estradiol were 32/62 respectively in the spring as well so more c/w perimenopause rather than menopause. She doesn't have any vasomotor sx at all  Patient has now had a thick EMS of >15 mm for quite a few months. It is homogeneous, there doesn't appear to be a polyp or fibroids  On the left ovary she has had intermittent cyst vs follicle as well. Most of the measurements have been much more follicular at 14-20mm, always simple, on her U/S in April there wasn't one there meaning they are not necessarily the same one and persistent but differnent ones  Patient is not having pain    Patient's last menstrual period was 2019 (exact date)..     Patient is sexually active, .  Using menopause for contraception.    reports that she has never smoked. She has never used smokeless tobacco.    STD testing offered?  Declined    Health maintenance updated:  yes    Problem list and histories reviewed & adjusted, as indicated.  Additional history: as documented.    Patient Active Problem List   Diagnosis     Ovarian cyst, bilateral     Thickened endometrium     Past Surgical History:   Procedure Laterality Date     LEEP TX, CERVICAL  1992    cone     TONSILLECTOMY        Social History     Tobacco Use     Smoking  "status: Never Smoker     Smokeless tobacco: Never Used   Substance Use Topics     Alcohol use: Yes      Problem (# of Occurrences) Relation (Name,Age of Onset)    Breast Cancer (1) Maternal Grandmother    Colon Cancer (1) Paternal Grandfather    Hyperlipidemia (1) Father    Hypertension (1) Father    No Known Problems (5) Sister, Brother, Maternal Grandfather, Paternal Grandmother, Other    Other Cancer (1) Mother: Lung, bone and brain cancer            No current outpatient medications on file.     No current facility-administered medications for this visit.      No Known Allergies    ROS:  12 point review of systems negative other than symptoms noted below or in the HPI.  No urinary frequency or dysuria, bladder or kidney problems, irregular menses, Negative for painful menses, heavy periods, vaginal discharge      OBJECTIVE:     /74   Ht 1.683 m (5' 6.25\")   Wt 90.1 kg (198 lb 9.6 oz)   LMP 08/16/2019 (Exact Date)   BMI 31.81 kg/m    Body mass index is 31.81 kg/m .    Exam:  Constitutional:  Appearance: Well nourished, well developed alert, in no acute distress  Neck:  Lymph Nodes:  No lymphadenopathy present; Thyroid:  Gland size normal, nontender, no nodules or masses present on palpation  Chest:  Respiratory Effort:  Breathing unlabored. Clear to auscultation bilaterally.   Cardiovascular: Heart: Auscultation:  Regular rate, normal rhythm, no murmurs present  Gastrointestinal:  Abdominal Examination:  Abdomen nontender to palpation, tone normal without rigidity or guarding, no masses present, umbilicus without lesions; Liver/Spleen:  No hepatomegaly present, liver nontender to palpation; Hernias:  No hernias present     In-Clinic Test Results:  No results found for this or any previous visit (from the past 24 hour(s)).    ASSESSMENT/PLAN:                                                        ICD-10-CM    1. Bilateral ovarian cysts N83.201 [facility]ative Worksheet G[facility]    N83.202    2. Abnormal " vaginal bleeding with endometrial thickness greater than 5 mm present on transvaginal ultrasound in postmenopausal patient N95.0 [facility]-Operative Worksheet GYN    R93.89          Though the patient's cyst is simple and hasn't grown in this amount of time it is persistent. At her age and given the follicles on her left and her hormones she is very likely just perimenopausal and having some hormonal fluctuations leading to physiologic cysts.  Just based on her cysts wouldn't necessarily recommend surgery since she's not sx. Could certainly continue to monitor them with U/S or if she has pain or change in sx  However given her persistent thick EMS without a true period and just irregular light spotting I feel that proceeding to full HSC with myosure curettage is indicated  Could certainly do an EMBx and then followed by a provera withdrawal but need more thorough sampling  If does have a polyp would not necessarily find that on embx either  If going to the OR for the HSC then it would just make sense to do a cystectomy on the right and possibly on the left  If both benign appearing, and technically speaking can remove just the cysts then wouldn't need to do an oopherectomy. Since not fully menopausal could potentially cause surgical menopause that is sx and patient would like to decrease those chances  Understands that if anything appears non-benign would remove the entire ovary and could need a further staging surgery if malignant with gyn onc  Discussed pros/cons and risks/benefits of both L/S and cystectomy with risk for salpingoopherectomy as well as HSC. Given info pamphlets to read. Has had  LEEP in past so some increased uterine perf risk exists depending on ease of cervical dilation.  Will proceed with scheduling surgery  Patient is healthy and on no medications. Medically cleared to proceed with surgery and will update H&P the AM of surgery  Marry Davey MD  [facility] [place]

## 2019-12-09 ENCOUNTER — ANCILLARY PROCEDURE (OUTPATIENT)
Dept: ULTRASOUND IMAGING | Facility: CLINIC | Age: 54
End: 2019-12-09
Attending: OBSTETRICS & GYNECOLOGY
Payer: COMMERCIAL

## 2019-12-09 ENCOUNTER — OFFICE VISIT (OUTPATIENT)
Dept: OBGYN | Facility: CLINIC | Age: 54
End: 2019-12-09
Attending: OBSTETRICS & GYNECOLOGY
Payer: COMMERCIAL

## 2019-12-09 ENCOUNTER — HEALTH MAINTENANCE LETTER (OUTPATIENT)
Age: 54
End: 2019-12-09

## 2019-12-09 VITALS
WEIGHT: 198.6 LBS | HEIGHT: 66 IN | BODY MASS INDEX: 31.92 KG/M2 | DIASTOLIC BLOOD PRESSURE: 74 MMHG | SYSTOLIC BLOOD PRESSURE: 138 MMHG

## 2019-12-09 DIAGNOSIS — N83.201 BILATERAL OVARIAN CYSTS: Primary | ICD-10-CM

## 2019-12-09 DIAGNOSIS — N95.0 ABNORMAL VAGINAL BLEEDING WITH ENDOMETRIAL THICKNESS GREATER THAN 5 MM PRESENT ON TRANSVAGINAL ULTRASOUND IN POSTMENOPAUSAL PATIENT: ICD-10-CM

## 2019-12-09 DIAGNOSIS — N83.201 RIGHT OVARIAN CYST: ICD-10-CM

## 2019-12-09 DIAGNOSIS — N83.202 BILATERAL OVARIAN CYSTS: Primary | ICD-10-CM

## 2019-12-09 DIAGNOSIS — R93.89 THICKENED ENDOMETRIUM: ICD-10-CM

## 2019-12-09 DIAGNOSIS — R93.89 ABNORMAL VAGINAL BLEEDING WITH ENDOMETRIAL THICKNESS GREATER THAN 5 MM PRESENT ON TRANSVAGINAL ULTRASOUND IN POSTMENOPAUSAL PATIENT: ICD-10-CM

## 2019-12-09 PROCEDURE — 99214 OFFICE O/P EST MOD 30 MIN: CPT | Performed by: OBSTETRICS & GYNECOLOGY

## 2019-12-09 PROCEDURE — 76830 TRANSVAGINAL US NON-OB: CPT | Performed by: OBSTETRICS & GYNECOLOGY

## 2019-12-09 ASSESSMENT — MIFFLIN-ST. JEOR: SCORE: 1521.56

## 2019-12-12 ENCOUNTER — PREP FOR PROCEDURE (OUTPATIENT)
Dept: OBGYN | Facility: CLINIC | Age: 54
End: 2019-12-12

## 2019-12-12 DIAGNOSIS — R93.89 THICKENED ENDOMETRIUM: ICD-10-CM

## 2019-12-12 DIAGNOSIS — N83.202 OVARIAN CYST, BILATERAL: Primary | ICD-10-CM

## 2019-12-12 DIAGNOSIS — N83.201 OVARIAN CYST, BILATERAL: Primary | ICD-10-CM

## 2019-12-13 ENCOUNTER — TELEPHONE (OUTPATIENT)
Dept: OBGYN | Facility: CLINIC | Age: 54
End: 2019-12-13

## 2019-12-13 PROBLEM — N83.201 OVARIAN CYST, BILATERAL: Status: ACTIVE | Noted: 2019-12-13

## 2019-12-13 PROBLEM — R93.89 THICKENED ENDOMETRIUM: Status: ACTIVE | Noted: 2019-12-13

## 2019-12-13 PROBLEM — N83.202 OVARIAN CYST, BILATERAL: Status: ACTIVE | Noted: 2019-12-13

## 2019-12-13 NOTE — TELEPHONE ENCOUNTER
Type of surgery: LSC BL OVARIAN CYSTECTOMY  Location of surgery: [facility]  Date and time of surgery: 2/4/2020 7:30a  Surgeon: Tamica mccurdy/Florencio to Assist  Pre-Op Appt Date: HOSPITAL   Post-Op Appt Date: TBD   Packet sent out: MAILED 12/13/2019  Pre-cert/Authorization completed:  TBD  Date: 12/13/2019 Brandy mccurdy/Clare Nieves  Surgery Scheduler    DX N83.21   R93.89  CPT 00551    [id number]    [facility] [id number]

## 2019-12-16 ENCOUNTER — PREP FOR PROCEDURE (OUTPATIENT)
Dept: OBGYN | Facility: CLINIC | Age: 54
End: 2019-12-16

## 2020-02-04 ENCOUNTER — ANESTHESIA (OUTPATIENT)
Dept: SURGERY | Facility: CLINIC | Age: 55
End: 2020-02-04
Payer: COMMERCIAL

## 2020-02-04 ENCOUNTER — ANESTHESIA EVENT (OUTPATIENT)
Dept: SURGERY | Facility: CLINIC | Age: 55
End: 2020-02-04
Payer: COMMERCIAL

## 2020-02-04 ENCOUNTER — HOSPITAL ENCOUNTER (OUTPATIENT)
Facility: CLINIC | Age: 55
Discharge: HOME | End: 2020-02-04
Attending: OBSTETRICS & GYNECOLOGY | Admitting: OBSTETRICS & GYNECOLOGY
Payer: COMMERCIAL

## 2020-02-04 VITALS
SYSTOLIC BLOOD PRESSURE: 133 MMHG | BODY MASS INDEX: 31.79 KG/M2 | DIASTOLIC BLOOD PRESSURE: 89 MMHG | WEIGHT: 197.8 LBS | OXYGEN SATURATION: 98 % | RESPIRATION RATE: 16 BRPM | HEIGHT: 66 IN | TEMPERATURE: 96.4 F | HEART RATE: 54 BPM

## 2020-02-04 DIAGNOSIS — R93.89 THICKENED ENDOMETRIUM: ICD-10-CM

## 2020-02-04 DIAGNOSIS — N83.201 OVARIAN CYST, BILATERAL: ICD-10-CM

## 2020-02-04 DIAGNOSIS — N83.202 OVARIAN CYST, BILATERAL: ICD-10-CM

## 2020-02-04 DIAGNOSIS — G89.18 POSTOPERATIVE PAIN: Primary | ICD-10-CM

## 2020-02-04 LAB
B-HCG SERPL-ACNC: 2 IU/L (ref 0–5)
ERYTHROCYTE [DISTWIDTH] IN BLOOD BY AUTOMATED COUNT: 12.4 % (ref 10–15)
HCT VFR BLD AUTO: 40.3 % (ref 35–47)
HGB BLD-MCNC: 13.9 G/DL (ref 11.7–15.7)
MCH RBC QN AUTO: 28.6 PG (ref 26.5–33)
MCHC RBC AUTO-ENTMCNC: 34.5 G/DL (ref 31.5–36.5)
MCV RBC AUTO: 83 FL (ref 78–100)
PLATELET # BLD AUTO: 224 10E9/L (ref 150–450)
RBC # BLD AUTO: 4.86 10E12/L (ref 3.8–5.2)
WBC # BLD AUTO: 5.6 10E9/L (ref 4–11)

## 2020-02-04 PROCEDURE — 85027 COMPLETE CBC AUTOMATED: CPT | Performed by: OBSTETRICS & GYNECOLOGY

## 2020-02-04 PROCEDURE — 37000009 ZZH ANESTHESIA TECHNICAL FEE, EACH ADDTL 15 MIN: Performed by: OBSTETRICS & GYNECOLOGY

## 2020-02-04 PROCEDURE — 88305 TISSUE EXAM BY PATHOLOGIST: CPT | Mod: 26 | Performed by: OBSTETRICS & GYNECOLOGY

## 2020-02-04 PROCEDURE — 71000012 ZZH RECOVERY PHASE 1 LEVEL 1 FIRST HR: Performed by: OBSTETRICS & GYNECOLOGY

## 2020-02-04 PROCEDURE — 37000008 ZZH ANESTHESIA TECHNICAL FEE, 1ST 30 MIN: Performed by: OBSTETRICS & GYNECOLOGY

## 2020-02-04 PROCEDURE — 58662 LAPAROSCOPY EXCISE LESIONS: CPT | Performed by: OBSTETRICS & GYNECOLOGY

## 2020-02-04 PROCEDURE — 36000056 ZZH SURGERY LEVEL 3 1ST 30 MIN: Performed by: OBSTETRICS & GYNECOLOGY

## 2020-02-04 PROCEDURE — 25000128 H RX IP 250 OP 636: Performed by: OBSTETRICS & GYNECOLOGY

## 2020-02-04 PROCEDURE — 58558 HYSTEROSCOPY BIOPSY: CPT | Mod: 51 | Performed by: OBSTETRICS & GYNECOLOGY

## 2020-02-04 PROCEDURE — 25000132 ZZH RX MED GY IP 250 OP 250 PS 637: Performed by: OBSTETRICS & GYNECOLOGY

## 2020-02-04 PROCEDURE — 25000128 H RX IP 250 OP 636: Performed by: ANESTHESIOLOGY

## 2020-02-04 PROCEDURE — 71000013 ZZH RECOVERY PHASE 1 LEVEL 1 EA ADDTL HR: Performed by: OBSTETRICS & GYNECOLOGY

## 2020-02-04 PROCEDURE — 25000125 ZZHC RX 250: Performed by: NURSE ANESTHETIST, CERTIFIED REGISTERED

## 2020-02-04 PROCEDURE — 25800030 ZZH RX IP 258 OP 636: Performed by: NURSE ANESTHETIST, CERTIFIED REGISTERED

## 2020-02-04 PROCEDURE — 88305 TISSUE EXAM BY PATHOLOGIST: CPT | Mod: 26,59 | Performed by: OBSTETRICS & GYNECOLOGY

## 2020-02-04 PROCEDURE — 88305 TISSUE EXAM BY PATHOLOGIST: CPT | Performed by: OBSTETRICS & GYNECOLOGY

## 2020-02-04 PROCEDURE — 88112 CYTOPATH CELL ENHANCE TECH: CPT | Performed by: OBSTETRICS & GYNECOLOGY

## 2020-02-04 PROCEDURE — 00000102 ZZHCL STATISTIC CYTO WRIGHT STAIN TC: Performed by: OBSTETRICS & GYNECOLOGY

## 2020-02-04 PROCEDURE — 00000155 ZZHCL STATISTIC H-CELL BLOCK W/STAIN: Performed by: OBSTETRICS & GYNECOLOGY

## 2020-02-04 PROCEDURE — 25000566 ZZH SEVOFLURANE, EA 15 MIN: Performed by: OBSTETRICS & GYNECOLOGY

## 2020-02-04 PROCEDURE — 88112 CYTOPATH CELL ENHANCE TECH: CPT | Mod: 26 | Performed by: OBSTETRICS & GYNECOLOGY

## 2020-02-04 PROCEDURE — 25800025 ZZH RX 258: Performed by: OBSTETRICS & GYNECOLOGY

## 2020-02-04 PROCEDURE — 40000170 ZZH STATISTIC PRE-PROCEDURE ASSESSMENT II: Performed by: OBSTETRICS & GYNECOLOGY

## 2020-02-04 PROCEDURE — 58662 LAPAROSCOPY EXCISE LESIONS: CPT | Mod: 80 | Performed by: OBSTETRICS & GYNECOLOGY

## 2020-02-04 PROCEDURE — 36000058 ZZH SURGERY LEVEL 3 EA 15 ADDTL MIN: Performed by: OBSTETRICS & GYNECOLOGY

## 2020-02-04 PROCEDURE — 25000125 ZZHC RX 250: Performed by: OBSTETRICS & GYNECOLOGY

## 2020-02-04 PROCEDURE — 36415 COLL VENOUS BLD VENIPUNCTURE: CPT | Performed by: OBSTETRICS & GYNECOLOGY

## 2020-02-04 PROCEDURE — 27210794 ZZH OR GENERAL SUPPLY STERILE: Performed by: OBSTETRICS & GYNECOLOGY

## 2020-02-04 PROCEDURE — 71000027 ZZH RECOVERY PHASE 2 EACH 15 MINS: Performed by: OBSTETRICS & GYNECOLOGY

## 2020-02-04 PROCEDURE — 25000128 H RX IP 250 OP 636: Performed by: NURSE ANESTHETIST, CERTIFIED REGISTERED

## 2020-02-04 PROCEDURE — 84702 CHORIONIC GONADOTROPIN TEST: CPT | Performed by: OBSTETRICS & GYNECOLOGY

## 2020-02-04 RX ORDER — GLYCOPYRROLATE 0.2 MG/ML
INJECTION, SOLUTION INTRAMUSCULAR; INTRAVENOUS PRN
Status: DISCONTINUED | OUTPATIENT
Start: 2020-02-04 | End: 2020-02-04

## 2020-02-04 RX ORDER — FENTANYL CITRATE 50 UG/ML
25-50 INJECTION, SOLUTION INTRAMUSCULAR; INTRAVENOUS
Status: DISCONTINUED | OUTPATIENT
Start: 2020-02-04 | End: 2020-02-04 | Stop reason: HOSPADM

## 2020-02-04 RX ORDER — ACETAMINOPHEN 325 MG/1
975 TABLET ORAL ONCE
Status: COMPLETED | OUTPATIENT
Start: 2020-02-04 | End: 2020-02-04

## 2020-02-04 RX ORDER — OXYCODONE HYDROCHLORIDE 5 MG/1
5-10 TABLET ORAL EVERY 4 HOURS PRN
Qty: 10 TABLET | Refills: 0 | Status: SHIPPED | OUTPATIENT
Start: 2020-02-04

## 2020-02-04 RX ORDER — PROPOFOL 10 MG/ML
INJECTION, EMULSION INTRAVENOUS CONTINUOUS PRN
Status: DISCONTINUED | OUTPATIENT
Start: 2020-02-04 | End: 2020-02-04

## 2020-02-04 RX ORDER — SODIUM CHLORIDE, SODIUM LACTATE, POTASSIUM CHLORIDE, CALCIUM CHLORIDE 600; 310; 30; 20 MG/100ML; MG/100ML; MG/100ML; MG/100ML
INJECTION, SOLUTION INTRAVENOUS CONTINUOUS
Status: DISCONTINUED | OUTPATIENT
Start: 2020-02-04 | End: 2020-02-04 | Stop reason: HOSPADM

## 2020-02-04 RX ORDER — OXYCODONE HYDROCHLORIDE 5 MG/1
5 TABLET ORAL
Status: COMPLETED | OUTPATIENT
Start: 2020-02-04 | End: 2020-02-04

## 2020-02-04 RX ORDER — ONDANSETRON 4 MG/1
4 TABLET, ORALLY DISINTEGRATING ORAL EVERY 30 MIN PRN
Status: DISCONTINUED | OUTPATIENT
Start: 2020-02-04 | End: 2020-02-04 | Stop reason: HOSPADM

## 2020-02-04 RX ORDER — LIDOCAINE HYDROCHLORIDE 20 MG/ML
INJECTION, SOLUTION INFILTRATION; PERINEURAL PRN
Status: DISCONTINUED | OUTPATIENT
Start: 2020-02-04 | End: 2020-02-04

## 2020-02-04 RX ORDER — HYDROMORPHONE HYDROCHLORIDE 1 MG/ML
.3-.5 INJECTION, SOLUTION INTRAMUSCULAR; INTRAVENOUS; SUBCUTANEOUS EVERY 10 MIN PRN
Status: DISCONTINUED | OUTPATIENT
Start: 2020-02-04 | End: 2020-02-04 | Stop reason: HOSPADM

## 2020-02-04 RX ORDER — KETOROLAC TROMETHAMINE 30 MG/ML
INJECTION, SOLUTION INTRAMUSCULAR; INTRAVENOUS PRN
Status: DISCONTINUED | OUTPATIENT
Start: 2020-02-04 | End: 2020-02-04

## 2020-02-04 RX ORDER — MAGNESIUM HYDROXIDE 1200 MG/15ML
LIQUID ORAL PRN
Status: DISCONTINUED | OUTPATIENT
Start: 2020-02-04 | End: 2020-02-04 | Stop reason: HOSPADM

## 2020-02-04 RX ORDER — NALOXONE HYDROCHLORIDE 0.4 MG/ML
.1-.4 INJECTION, SOLUTION INTRAMUSCULAR; INTRAVENOUS; SUBCUTANEOUS
Status: DISCONTINUED | OUTPATIENT
Start: 2020-02-04 | End: 2020-02-04 | Stop reason: HOSPADM

## 2020-02-04 RX ORDER — MEPERIDINE HYDROCHLORIDE 25 MG/ML
12.5 INJECTION INTRAMUSCULAR; INTRAVENOUS; SUBCUTANEOUS
Status: DISCONTINUED | OUTPATIENT
Start: 2020-02-04 | End: 2020-02-04 | Stop reason: HOSPADM

## 2020-02-04 RX ORDER — CEFAZOLIN SODIUM 2 G/100ML
2 INJECTION, SOLUTION INTRAVENOUS
Status: COMPLETED | OUTPATIENT
Start: 2020-02-04 | End: 2020-02-04

## 2020-02-04 RX ORDER — DEXAMETHASONE SODIUM PHOSPHATE 4 MG/ML
INJECTION, SOLUTION INTRA-ARTICULAR; INTRALESIONAL; INTRAMUSCULAR; INTRAVENOUS; SOFT TISSUE PRN
Status: DISCONTINUED | OUTPATIENT
Start: 2020-02-04 | End: 2020-02-04

## 2020-02-04 RX ORDER — FENTANYL CITRATE 50 UG/ML
INJECTION, SOLUTION INTRAMUSCULAR; INTRAVENOUS PRN
Status: DISCONTINUED | OUTPATIENT
Start: 2020-02-04 | End: 2020-02-04

## 2020-02-04 RX ORDER — PROPOFOL 10 MG/ML
INJECTION, EMULSION INTRAVENOUS PRN
Status: DISCONTINUED | OUTPATIENT
Start: 2020-02-04 | End: 2020-02-04

## 2020-02-04 RX ORDER — NEOSTIGMINE METHYLSULFATE 1 MG/ML
VIAL (ML) INJECTION PRN
Status: DISCONTINUED | OUTPATIENT
Start: 2020-02-04 | End: 2020-02-04

## 2020-02-04 RX ORDER — EPHEDRINE SULFATE 50 MG/ML
INJECTION, SOLUTION INTRAMUSCULAR; INTRAVENOUS; SUBCUTANEOUS PRN
Status: DISCONTINUED | OUTPATIENT
Start: 2020-02-04 | End: 2020-02-04

## 2020-02-04 RX ORDER — SODIUM CHLORIDE, SODIUM LACTATE, POTASSIUM CHLORIDE, CALCIUM CHLORIDE 600; 310; 30; 20 MG/100ML; MG/100ML; MG/100ML; MG/100ML
INJECTION, SOLUTION INTRAVENOUS CONTINUOUS PRN
Status: DISCONTINUED | OUTPATIENT
Start: 2020-02-04 | End: 2020-02-04

## 2020-02-04 RX ORDER — BUPIVACAINE HYDROCHLORIDE 2.5 MG/ML
INJECTION, SOLUTION INFILTRATION; PERINEURAL PRN
Status: DISCONTINUED | OUTPATIENT
Start: 2020-02-04 | End: 2020-02-04 | Stop reason: HOSPADM

## 2020-02-04 RX ORDER — ONDANSETRON 2 MG/ML
4 INJECTION INTRAMUSCULAR; INTRAVENOUS EVERY 30 MIN PRN
Status: DISCONTINUED | OUTPATIENT
Start: 2020-02-04 | End: 2020-02-04 | Stop reason: HOSPADM

## 2020-02-04 RX ORDER — ONDANSETRON 2 MG/ML
INJECTION INTRAMUSCULAR; INTRAVENOUS PRN
Status: DISCONTINUED | OUTPATIENT
Start: 2020-02-04 | End: 2020-02-04

## 2020-02-04 RX ORDER — CEFAZOLIN SODIUM 1 G/3ML
1 INJECTION, POWDER, FOR SOLUTION INTRAMUSCULAR; INTRAVENOUS SEE ADMIN INSTRUCTIONS
Status: DISCONTINUED | OUTPATIENT
Start: 2020-02-04 | End: 2020-02-04 | Stop reason: HOSPADM

## 2020-02-04 RX ADMIN — MIDAZOLAM 2 MG: 1 INJECTION INTRAMUSCULAR; INTRAVENOUS at 07:42

## 2020-02-04 RX ADMIN — PROPOFOL 30 MCG/KG/MIN: 10 INJECTION, EMULSION INTRAVENOUS at 07:46

## 2020-02-04 RX ADMIN — ROCURONIUM BROMIDE 50 MG: 10 INJECTION INTRAVENOUS at 07:42

## 2020-02-04 RX ADMIN — KETOROLAC TROMETHAMINE 30 MG: 30 INJECTION, SOLUTION INTRAMUSCULAR at 09:16

## 2020-02-04 RX ADMIN — Medication 5 MG: at 08:37

## 2020-02-04 RX ADMIN — CEFAZOLIN SODIUM 2 G: 2 INJECTION, SOLUTION INTRAVENOUS at 07:53

## 2020-02-04 RX ADMIN — FENTANYL CITRATE 50 MCG: 50 INJECTION, SOLUTION INTRAMUSCULAR; INTRAVENOUS at 08:22

## 2020-02-04 RX ADMIN — OXYCODONE HYDROCHLORIDE 5 MG: 5 TABLET ORAL at 10:28

## 2020-02-04 RX ADMIN — ONDANSETRON 4 MG: 2 INJECTION INTRAMUSCULAR; INTRAVENOUS at 08:55

## 2020-02-04 RX ADMIN — LIDOCAINE HYDROCHLORIDE 80 MG: 20 INJECTION, SOLUTION INFILTRATION; PERINEURAL at 07:42

## 2020-02-04 RX ADMIN — Medication 5 MG: at 09:18

## 2020-02-04 RX ADMIN — PROPOFOL 200 MG: 10 INJECTION, EMULSION INTRAVENOUS at 07:42

## 2020-02-04 RX ADMIN — NEOSTIGMINE METHYLSULFATE 4.5 MG: 1 INJECTION, SOLUTION INTRAVENOUS at 08:55

## 2020-02-04 RX ADMIN — FENTANYL CITRATE 50 MCG: 0.05 INJECTION, SOLUTION INTRAMUSCULAR; INTRAVENOUS at 09:50

## 2020-02-04 RX ADMIN — ROCURONIUM BROMIDE 10 MG: 10 INJECTION INTRAVENOUS at 08:32

## 2020-02-04 RX ADMIN — SODIUM CHLORIDE, POTASSIUM CHLORIDE, SODIUM LACTATE AND CALCIUM CHLORIDE: 600; 310; 30; 20 INJECTION, SOLUTION INTRAVENOUS at 07:39

## 2020-02-04 RX ADMIN — FENTANYL CITRATE 50 MCG: 50 INJECTION, SOLUTION INTRAMUSCULAR; INTRAVENOUS at 07:42

## 2020-02-04 RX ADMIN — GLYCOPYRROLATE 0.7 MG: 0.2 INJECTION, SOLUTION INTRAMUSCULAR; INTRAVENOUS at 08:55

## 2020-02-04 RX ADMIN — ACETAMINOPHEN 975 MG: 325 TABLET, FILM COATED ORAL at 06:06

## 2020-02-04 RX ADMIN — DEXAMETHASONE SODIUM PHOSPHATE 4 MG: 4 INJECTION, SOLUTION INTRA-ARTICULAR; INTRALESIONAL; INTRAMUSCULAR; INTRAVENOUS; SOFT TISSUE at 07:54

## 2020-02-04 ASSESSMENT — MIFFLIN-ST. JEOR: SCORE: 1513.96

## 2020-02-04 ASSESSMENT — LIFESTYLE VARIABLES: TOBACCO_USE: 0

## 2020-02-04 NOTE — OP NOTE
Procedure Date: 02/04/2020      PREOPERATIVE DIAGNOSES:  Bilateral ovarian cysts and thickened endometrial stripe.      POSTOPERATIVE DIAGNOSES:  Right ovarian simple cyst and thickened endometrium, minimally.      PROCEDURES:  Laparoscopy with right ovarian cystectomy, hysteroscopy with curettage using MyoSure.      ANESTHESIA:  General.      SURGEON:  Marry Davey MD      ASSISTANT:  Jesika Matias DO.  Dr. Matias' assistance was required due to a presumably postmenopausal female with a persistent ovarian cyst.  There was potential for non-benign nature and with her assistance, we were able to improve visualization and minimize blood loss and decrease complication rate.      COMPLICATIONS:  None.      ESTIMATED BLOOD LOSS:  5 mL.      HYSTEROSCOPIC FLUID DEFICIT:  115 mL of normal saline.      SPECIMENS:     1.  Right ovarian cyst fluid.   2.  Right ovarian cyst wall.     3.  Endometrial curettings.      OPERATIVE INDICATIONS:  Isabel is a 54-year-old female who was seen in 11/2018 for a routine gynecologic exam.  She had not ever gone 1 full year without a period and was not having any vasomotor symptoms, so was presumably perimenopausal at that time.  The patient reported to the nurse practitioner that she saw that she had a very vivid dream of having uterine cancer and it concerned her and requested to have an ultrasound.  The nurse practitioner discussed pros and cons of this indication, prompting an ultrasound, but did agree to get one.  The patient had that first ultrasound done 12/04/2018 and at that time her endometrial thickness was 8.6 mm.  She had a 5.4 x 5.2 x 3.5 cm simple right ovarian cyst.  She also had a 1.4 cm left ovarian cyst versus follicle.  She then saw me and because of the simple nature and because she was not clearly menopausal, we determined that we would follow up with a repeat ultrasound in 6 weeks.  On repeat ultrasound, her endometrial stripe was now a little thinner at 5.3 mm,  but the right ovarian cyst persisted at 5 x 4.8 cm.  She had a persistence of this left ovarian cyst versus follicle at 1.1 cm.  The patient wished to avoid surgery because she was asymptomatic, we determined that we would followup once more.  In 04/2019, she had a repeat ultrasound.  At this time, the right ovarian cyst was essentially unchanged at 5.3 cm and the left ovary no longer contained any follicle or cyst.  Her endometrial stripe thickness at that time was 14.4 mm and there were some echogenic projection seen from the endometrium into the myometrium that made it suspicious for adenomyosis.  Subsequent to that, we did 2 additional ultrasounds in 08/2018 and 12/2019, and this ovarian cyst remained unchanged.  Her endometrial stripe thickness did fluctuate though and did appear to be a thickened between 15 and 21 mm at different times.  There were no obvious polyps or fibroids noted and the patient continued to just have very rare and intermittent pink spotting, but never anything that was a full menstrual cycle.  A CA-125 was done in 04/2019 and it was normal at 11.  She did have an endometrial biopsy along the way and this showed a benign minimally proliferative endometrium; however, the cyst persisted.  She did have hormone testing done and the FSH was elevated into the 60s, but the estradiol was in the 30s, making it somewhat unclear if the patient really was fully menopausal or not.  Because of the persistent nature of this cyst as well as the endometrial stripe thickness, decision was made to proceed to definitive surgical management.  We discussed ovarian cystectomies bilaterally versus right salpingo-oophorectomy if it appeared to be non-benign as well as hysteroscopy with curettage.  All the risks and benefits of this were discussed with the patient and her consent was obtained.      OPERATIVE FINDINGS:  On laparoscopy, the patient had a normal uterus, left tube and ovary where there was no cyst or  follicle noted.  The right fallopian tube was normal.  The right ovary had a large smooth simple cyst.  It was drained of at least 50 mL of thin straw-colored fluid, although there was a small amount of intra-abdominal spillage of this fluid.  The ovary otherwise looked completely benign.  The liver, appendix and gallbladder were all visualized and all appeared to be normal.  On hysteroscopy, the uterus sounded to 7.5 cm prior to doing the hysteroscopy.  Then, with hysteroscopy, there was actually not noted to be any significant endometrial tissue present.  There was some more extensive fluffy but normal-appearing endometrial tissue on the posterior wall closer to the lower uterine segment and actually along a fairly long cervical canal, but both tubal ostia were clearly seen and there were absolutely no abnormalities noted otherwise such as polyps or fibroids.      DRAINS:  None.      PROCEDURE IN DETAIL:  The patient was taken to the operating room and general anesthesia was obtained.  The patient was placed in dorsal lithotomy position and prepped and draped in the normal fashion.  A timeout was undertaken and then a speculum placed into the vagina.  The anterior lip of the cervix was grasped with a tenaculum and an acorn cannula placed transcervically and articulated with the tenaculum.  Speculum was then removed and a Mireles catheter was placed.  Attention was then turned to the abdomen.  The infraumbilical skin was injected with 0.25% plain bupivacaine and a vertical infraumbilical 5 mm skin incision was made.  Under direct visualization with the 5 mm trocar, the laparoscope was placed into the peritoneal cavity.  The trocar was removed and the laparoscope replaced through the sheath and the abdomen was insufflated with carbon dioxide gas.  Next, under direct visualization, both the right and left lower quadrant sites were injected with 0.25% bupivacaine in the subcu and in the peritoneum and 2 additional 5 mm  trocars were placed.  The left ovary was carefully inspected as there had been either a cyst or follicle there, but none was seen today.  The right ovary was then elevated with a grasper and using an aspirating laparoscopic needle attached to a 60 mL syringe, we were able to puncture through into the cyst fluid and drainage.  There was a small amount of leakage of this clear cyst fluid intraabdominally, but it appeared completely benign.  Then, using a monopolar hook, the area where we had punctured into the cyst to drain it was extended and opened.  Using graspers with teeth and [place] graspers, the cyst wall was able to be removed with gentle traction from the remainder of the ovary.  The base of the ovarian cyst was then cauterized using the monopolar spatula.  The cyst wall was somewhat too thick to be removed just directly through the 5 mm trocar.  Laparoscopic scissors were used to transect it almost in half, so that it would stretch out in a more lengthwise fashion and then once this was done, we were able to remove the cyst directly through the 5 mm incision site without needing an EndoCatch bag.  At this point, the pelvis was irrigated and any free fluid was suctioned.  The ovarian cyst base was reinspected and found to be primarily hemostatic.  Some Surgicel powder was applied to the cyst base to assure hemostasis as well.  At this point, all the carbon dioxide gas was allowed to escape from the abdomen and the 3 trocars were removed.  The skin sites were then closed with a 4-0 Monocryl in a subcuticular fashion.      Attention was then returned vaginally, where the speculum was replaced.  The acorn cannula was removed while the tenaculum was left in place.  The cervix was then easily dilated up to a #7 Hegar dilator.  The hysteroscope was then able to be placed transcervically and the uterine cavity instilled with saline with the previously mentioned findings.  Using the MyoSure device, extensive  curettage of the entire uterine cavity and endocervical canal was done.  As previously mentioned, there was not any significant endometrial tissue to account for the significantly thickened endometrial stripe seen on ultrasound, but it seems that this may have been somewhat more consistent with some adenomyosis within the myometrium distorting the perimeter of the endometrial stripe on ultrasound; however, any additional fluffy tissue was curetted out using the MyoSure device.  Once this was done, the MyoSure and hysteroscope were removed.  The speculum had been removed during the hysteroscopy process and was at this time replaced.  All vaginal instruments were removed including the tenaculum and the tenaculum site was confirmed to be hemostatic.  At this point, the speculum was removed as was the Mireles catheter.      The patient tolerated the procedure well and there were no complications.  All sponge, lap and instrument counts were correct x2.      DRAINS:  None.      DISPOSITION:  The patient was taken to the post-anesthesia recovery room in stable condition.         MOUSTAPHA ORTIZ MD             D: 2020   T: 2020   MT: NAILA      Name:     KELVIN CARLSON   MRN:      [id number]        Account:        [id number]   :      1965           Procedure Date: 2020      Document: [id number]

## 2020-02-04 NOTE — ANESTHESIA POSTPROCEDURE EVALUATION
Patient: Isabel Irene    Procedure(s):  LAPAROSCOPY RIGHT OVARIAN CYSTECTOMY  HYSTEROSCOPY WITH CURRETAGE USING MYOSURE    Diagnosis:Ovarian cyst, bilateral [N83.201, N83.202]  Thickened endometrium [R93.89]  Diagnosis Additional Information: No value filed.    Anesthesia Type:  General, ETT    Note:  Anesthesia Post Evaluation    Patient location during evaluation: PACU  Patient participation: Able to fully participate in evaluation  Level of consciousness: awake  Pain management: adequate  Airway patency: patent  Cardiovascular status: acceptable  Respiratory status: acceptable  Hydration status: acceptable  PONV: controlled     Anesthetic complications: None          Last vitals:  Vitals:    02/04/20 1030 02/04/20 1045 02/04/20 1115   BP: 120/78 125/78 133/89   Pulse: 65 54    Resp: 16 11 16   Temp: 36.1  C (97  F) 35.8  C (96.4  F)    SpO2: 99% 94% 98%         Electronically Signed By: Chepe Perez MD  February 4, 2020  12:26 PM

## 2020-02-04 NOTE — ANESTHESIA CARE TRANSFER NOTE
Patient: Isabel Irene    Procedure(s):  LAPAROSCOPY RIGHT OVARIAN CYSTECTOMY  HYSTEROSCOPY WITH CURRETAGE USING MYOSURE    Diagnosis: Ovarian cyst, bilateral [N83.201, N83.202]  Thickened endometrium [R93.89]  Diagnosis Additional Information: No value filed.    Anesthesia Type:   General, ETT     Note:  Airway :Face Mask  Patient transferred to:Phase II  Comments: Patient is in Phase 2 and on monitors.  Report given to RN.  VSS et spontaneous resp.      Vitals: (Last set prior to Anesthesia Care Transfer)    CRNA VITALS  2/4/2020 0859 - 2/4/2020 0933      2/4/2020             NIBP:  (!) 136/92    NIBP Mean:  106                Electronically Signed By: JOSE CRUZ Gomez CRNA  February 4, 2020  9:33 AM

## 2020-02-04 NOTE — H&P
Patient's history was reviewed from her last office visit with me on 12/9/19 and no new history noted.  Patient's vitals are stable with slightly elevated initial BP of 136/92  CV:rrr, no murmurs  Lungs:CTA bilaterally    Patient is medically cleared to proceed with surgery.

## 2020-02-04 NOTE — TELEPHONE ENCOUNTER
PDMP Printed and given to  Dr. Clemente to review.   These notes were forgotten as they happened...    1/22/2020 Ann with [facility] contacted me via [facility] to advise pt needs a referral from her PCP. I called pt and LVM to let her know she needs to contact her PCP for a referral    1/29/2020 Pt OUMARM for me stating that she spoke with her PCP and that they told her the referral was done. She checked with her insurance who told her they had nothing on file. She called back to her PCP and they told her they submitted it again. Wondering if we could tell if it was completed. Reached to Ann at [facility] who stated nothing is showing in the BC system. CB to pt JORGE to advise we do not see anything. Advised her she does have out of network benefits but that her coverage would be better if her PCP would do the referral.    2/4/2020 Ann at [facility] states still no referral in the system.

## 2020-02-04 NOTE — BRIEF OP NOTE
[facility]    Brief Operative Note    Pre-operative diagnosis: Ovarian cyst, bilateral [N83.201, N83.202]  Thickened endometrium [R93.89]  Post-operative diagnosis Right ovarian simple cyst  Thickened endometrium-minimally    Procedure: Procedure(s):  LAPAROSCOPY RIGHT OVARIAN CYSTECTOMY  HYSTEROSCOPY WITH CURRETAGE USING MYOSURE  Surgeon:Tamica  Assist: Masters   Anesthesia: General   Estimated blood loss: Less than 10 ml  Drains: None  Specimens:   ID Type Source Tests Collected by Time Destination   1 : RIGHT OVARIAN CYST FLUID Fluid Ovary, Right CYTOLOGY NON GYN Marry Davey MD 2/4/2020  8:32 AM    A : RIGHT OVARIAN CYST Cyst Ovary, Right SURGICAL PATHOLOGY EXAM Marry Davey MD 2/4/2020  8:46 AM    B : ENDOMETRIAL CURRETTINGS Tissue Endometrium SURGICAL PATHOLOGY EXAM Marry Davey MD 2/4/2020  9:15 AM      Findings:   normal uterus, left tube and ovary w/o a cyst. the right ovary had a large smooth simple cyst. clear thin straw colored fluid aspirated from it. some intraabd spillage of fluid but at least 50cc of fluid aspirated. o/w normal GB, liver and appendix. on hysteroscopy there was some fluffy but normal endometrial tissue. not c/w the EMS measured on U/S. both tubal ostia clearly seen. long cervix that had some excess tissue as well at BOOKER/cervix and mostly posteriorly. no polyps or fibroids..  Complications: None.  Implants:

## 2020-02-04 NOTE — DISCHARGE INSTRUCTIONS
Same Day Surgery Discharge Instructions for  Sedation and General Anesthesia       It's not unusual to feel dizzy, light-headed or faint for up to 24 hours after surgery or while taking pain medication.  If you have these symptoms: sit for a few minutes before standing and have someone assist you when you get up to walk or use the bathroom.      You should rest and relax for the next 24 hours. We recommend you make arrangements to have an adult stay with you for at least 24 hours after your discharge.  Avoid hazardous and strenuous activity.      DO NOT DRIVE any vehicle or operate mechanical equipment for 24 hours following the end of your surgery.  Even though you may feel normal, your reactions may be affected by the medication you have received.      Do not drink alcoholic beverages for 24 hours following surgery.       Slowly progress to your regular diet as you feel able. It's not unusual to feel nauseated and/or vomit after receiving anesthesia.  If you develop these symptoms, drink clear liquids (apple juice, ginger ale, broth, 7-up, etc. ) until you feel better.  If your nausea and vomiting persists for 24 hours, please notify your surgeon.        All narcotic pain medications, along with inactivity and anesthesia, can cause constipation. Drinking plenty of liquids and increasing fiber intake will help.      For any questions of a medical nature, call your surgeon.      Do not make important decisions for 24 hours.      If you had general anesthesia, you may have a sore throat for a couple of days related to the breathing tube used during surgery.  You may use Cepacol lozenges to help with this discomfort.  If it worsens or if you develop a fever, contact your surgeon.       If you feel your pain is not well managed with the pain medications prescribed by your surgeon, please contact your surgeon's office to let them know so they can address your concerns.       Today you received Toradol, an  antiinflammatory medication similar to Ibuprofen.  You should not take other antiinflammatory medication, such as Ibuprofen, Motrin, Advil, Aleve, Naprosyn, etc until 3:15 PM.         DISCHARGE INSTRUCTIONS FOLLOWING LAPAROSCOPY    ACTIVITY:  You may resume normal activities including lifting as your abdominal discomfort disappears.  You may return to work after two days if you feel well enough.  Possible abdominal and shoulder discomfort due to gas remaining in the abdomen should be gone within 48 hours.  It is permissible to climb stairs. Showers are perfectly acceptable. You may drive a car after 24 hours as long as you are not taking narcotic pain pills.    CHECK-UP:  You should be seen one month after discharge unless home instruction sheet states otherwise and please phone the office the day after discharge and schedule an appointment with your physician.    VAGINAL DISCHARGE:  You may have some vaginal bleeding or discharge for about a week after discharge.  You should avoid douches, tampons, and intercourse for the first week.    TEMPERATURE:  If you develop temperature levels to over 100.4 F your physician should be called immediately.    STITCHES:  There is usually a stitch under the skin incisions which will dissolve and do not need to be removed.  The bandaids may be removed at any time.    DIET:  [place] or light diet is advisable the day of surgery.  If nausea persists, continue this diet.  If severe, call.    Please call the office for increasingly severe abdominal pain or vaginal bleeding in excess of one pad per hour.  This will rarely be a problem.    [facility]  [phone]      **If you have questions or concerns about your procedure,  call Dr. Davey at [phone]**

## 2020-02-04 NOTE — OR NURSING
Father updated- PNDS met, po per I&O sheet. Pt dressed, up in recliner and transported to Phase 2.

## 2020-02-04 NOTE — ANESTHESIA PREPROCEDURE EVALUATION
"Anesthesia Pre-Procedure Evaluation    Patient: Isabel Irene   MRN: [id number] : 1965          Preoperative Diagnosis: Ovarian cyst, bilateral [N83.201, N83.202]  Thickened endometrium [R93.89]    Procedure(s):  LAPAROSCOPY BILATERAL OVARIAN CYSTECTOMY  POSSIBLE LAPAROSCOPIC RIGHT SALPINGO OOPHERECTOMY  HYSTEROSCOPY WITH CURRETAGE USING MYOSURE    Past Medical History:   Diagnosis Date     Abnormal Pap smear of cervix 1992 LEEP/cone     Ovarian cyst      Thickened endometrium      Past Surgical History:   Procedure Laterality Date     LEEP TX, CERVICAL      cone     TONSILLECTOMY         Anesthesia Evaluation     . Pt has had prior anesthetic.     No history of anesthetic complications          ROS/MED HX    ENT/Pulmonary:      (-) tobacco use, asthma and sleep apnea   Neurologic:       Cardiovascular:         METS/Exercise Tolerance:     Hematologic:         Musculoskeletal:         GI/Hepatic:        (-) GERD   Renal/Genitourinary:         Endo:     (+) Obesity, .      Psychiatric:         Infectious Disease:         Malignancy:         Other:                          Physical Exam  Normal systems: dental    Airway   Mallampati: II  TM distance: >3 FB  Neck ROM: full    Dental     Cardiovascular   Rhythm and rate: regular and normal      Pulmonary    breath sounds clear to auscultation            Lab Results   Component Value Date    WBC 5.6 2020    HGB 13.9 2020    HCT 40.3 2020     2020       Preop Vitals  BP Readings from Last 3 Encounters:   20 (!) 151/82   19 138/74   19 (!) 146/72    Pulse Readings from Last 3 Encounters:   20 80   19 76   18 72      Resp Readings from Last 3 Encounters:   20 22    SpO2 Readings from Last 3 Encounters:   20 96%      Temp Readings from Last 1 Encounters:   20 36.8  C (98.2  F) (Temporal)    Ht Readings from Last 1 Encounters:   20 1.676 m (5' 6\")      Wt " "Readings from Last 1 Encounters:   02/04/20 89.7 kg (197 lb 12.8 oz)    Estimated body mass index is 31.93 kg/m  as calculated from the following:    Height as of this encounter: 1.676 m (5' 6\").    Weight as of this encounter: 89.7 kg (197 lb 12.8 oz).       Anesthesia Plan      History & Physical Review  History and physical reviewed and following examination; no interval change.    ASA Status:  2 .        Plan for General and ETT with Intravenous induction. Maintenance will be Balanced.    PONV prophylaxis:  Ondansetron (or other 5HT-3) and Dexamethasone or Solumedrol  Additional equipment: Videolaryngoscope      Postoperative Care      Consents  Anesthetic plan, risks, benefits and alternatives discussed with:  Patient..                 Tasha Tucker  "

## 2020-02-05 LAB — COPATH REPORT: NORMAL

## 2020-02-06 LAB — COPATH REPORT: NORMAL

## 2020-03-15 ENCOUNTER — HEALTH MAINTENANCE LETTER (OUTPATIENT)
Age: 55
End: 2020-03-15

## 2021-01-15 ENCOUNTER — HEALTH MAINTENANCE LETTER (OUTPATIENT)
Age: 56
End: 2021-01-15

## 2021-05-09 ENCOUNTER — HEALTH MAINTENANCE LETTER (OUTPATIENT)
Age: 56
End: 2021-05-09

## 2021-10-24 ENCOUNTER — HEALTH MAINTENANCE LETTER (OUTPATIENT)
Age: 56
End: 2021-10-24

## 2022-06-05 ENCOUNTER — HEALTH MAINTENANCE LETTER (OUTPATIENT)
Age: 57
End: 2022-06-05

## 2022-10-15 ENCOUNTER — HEALTH MAINTENANCE LETTER (OUTPATIENT)
Age: 57
End: 2022-10-15

## 2023-03-26 ENCOUNTER — HEALTH MAINTENANCE LETTER (OUTPATIENT)
Age: 58
End: 2023-03-26

## 2023-06-11 ENCOUNTER — HEALTH MAINTENANCE LETTER (OUTPATIENT)
Age: 58
End: 2023-06-11

## (undated) DEVICE — WIPES FOLEY CARE SURESTEP PROVON DFC100

## (undated) DEVICE — DRSG TELFA 3X8" 1238

## (undated) DEVICE — Device

## (undated) DEVICE — SEAL SET MYOSURE ROD LENS SCOPE SINGLE USE 40-902

## (undated) DEVICE — GLOVE PROTEXIS W/NEU-THERA 6.5  2D73TE65

## (undated) DEVICE — DECANTER VIAL 2006S

## (undated) DEVICE — GLOVE PROTEXIS BLUE W/NEU-THERA 7.0  2D73EB70

## (undated) DEVICE — SOL NACL 0.9% INJ 1000ML BAG 2B1324X

## (undated) DEVICE — ENDO TROCAR SLEEVE KII ADV FIXATION 05X100MM CFS02

## (undated) DEVICE — ESU HOLDER LAP INST DISP PURPLE LONG 330MM H-PRO-330

## (undated) DEVICE — SOL WATER IRRIG 1000ML BOTTLE 2F7114

## (undated) DEVICE — MANIFOLD NEPTUNE 4 PORT 700-20

## (undated) DEVICE — EVAC SYSTEM CLEAR FLOW SC082500

## (undated) DEVICE — PREP DURAPREP 26ML APL 8630

## (undated) DEVICE — LINEN TOWEL PACK X5 5464

## (undated) DEVICE — CATH TRAY FOLEY SURESTEP 16FR WDRAIN BAG STLK LATEX A300316A

## (undated) DEVICE — SU MONOCRYL 4-0 PS-2 18" UND Y496G

## (undated) DEVICE — KIT PROCEDURE FLUENT IN/OUT FLOWPAK TISS TRAP FLT-112S

## (undated) DEVICE — SOL NACL 0.9% IRRIG 3000ML BAG 2B7477

## (undated) DEVICE — GLOVE PROTEXIS W/NEU-THERA 7.0  2D73TE70

## (undated) DEVICE — ESU CORD MONOPOLAR 10'  E0510

## (undated) DEVICE — DRSG STERI STRIP 1/4X3" R1541

## (undated) DEVICE — SOL NACL 0.9% IRRIG 1000ML BOTTLE 2F7124

## (undated) DEVICE — ENDO SCOPE WARMER LF TM500

## (undated) DEVICE — SUCTION IRR STRYKERFLOW II W/TIP 250-070-520

## (undated) DEVICE — SURGICEL POWDER ABSORBABLE HEMOSTAT 3GM 3013SP

## (undated) DEVICE — ENDO TROCAR FIRST ENTRY KII FIOS ADV FIX 05X100MM CFF03

## (undated) DEVICE — SYR 50ML LL W/O NDL 309653

## (undated) RX ORDER — LIDOCAINE HYDROCHLORIDE 20 MG/ML
INJECTION, SOLUTION EPIDURAL; INFILTRATION; INTRACAUDAL; PERINEURAL
Status: DISPENSED
Start: 2020-02-04

## (undated) RX ORDER — FENTANYL CITRATE 50 UG/ML
INJECTION, SOLUTION INTRAMUSCULAR; INTRAVENOUS
Status: DISPENSED
Start: 2020-02-04

## (undated) RX ORDER — PROPOFOL 10 MG/ML
INJECTION, EMULSION INTRAVENOUS
Status: DISPENSED
Start: 2020-02-04

## (undated) RX ORDER — CEFAZOLIN SODIUM 2 G/100ML
INJECTION, SOLUTION INTRAVENOUS
Status: DISPENSED
Start: 2020-02-04

## (undated) RX ORDER — NEOSTIGMINE METHYLSULFATE 1 MG/ML
VIAL (ML) INJECTION
Status: DISPENSED
Start: 2020-02-04

## (undated) RX ORDER — ONDANSETRON 2 MG/ML
INJECTION INTRAMUSCULAR; INTRAVENOUS
Status: DISPENSED
Start: 2020-02-04

## (undated) RX ORDER — KETOROLAC TROMETHAMINE 30 MG/ML
INJECTION, SOLUTION INTRAMUSCULAR; INTRAVENOUS
Status: DISPENSED
Start: 2020-02-04

## (undated) RX ORDER — BUPIVACAINE HYDROCHLORIDE 2.5 MG/ML
INJECTION, SOLUTION EPIDURAL; INFILTRATION; INTRACAUDAL
Status: DISPENSED
Start: 2020-02-04

## (undated) RX ORDER — FENTANYL CITRATE 0.05 MG/ML
INJECTION, SOLUTION INTRAMUSCULAR; INTRAVENOUS
Status: DISPENSED
Start: 2020-02-04

## (undated) RX ORDER — ACETAMINOPHEN 325 MG/1
TABLET ORAL
Status: DISPENSED
Start: 2020-02-04

## (undated) RX ORDER — OXYCODONE HYDROCHLORIDE 5 MG/1
TABLET ORAL
Status: DISPENSED
Start: 2020-02-04

## (undated) RX ORDER — DEXAMETHASONE SODIUM PHOSPHATE 4 MG/ML
INJECTION, SOLUTION INTRA-ARTICULAR; INTRALESIONAL; INTRAMUSCULAR; INTRAVENOUS; SOFT TISSUE
Status: DISPENSED
Start: 2020-02-04

## (undated) RX ORDER — GLYCOPYRROLATE 0.2 MG/ML
INJECTION, SOLUTION INTRAMUSCULAR; INTRAVENOUS
Status: DISPENSED
Start: 2020-02-04